# Patient Record
Sex: MALE | Race: ASIAN | NOT HISPANIC OR LATINO | Employment: UNEMPLOYED | URBAN - METROPOLITAN AREA
[De-identification: names, ages, dates, MRNs, and addresses within clinical notes are randomized per-mention and may not be internally consistent; named-entity substitution may affect disease eponyms.]

---

## 2020-06-10 ENCOUNTER — TELEPHONE (OUTPATIENT)
Dept: INTERNAL MEDICINE | Facility: CLINIC | Age: 25
End: 2020-06-10

## 2020-06-10 NOTE — TELEPHONE ENCOUNTER
LVM that when he sees Dr. Ferguson they can discuss this and she would be the one to order this. But it would totally up to Dr. Ferguson to have him do this. Nay more questions he can call office and number was provided

## 2020-06-10 NOTE — TELEPHONE ENCOUNTER
Patient requested a call back. Patient has a new patient appointment with Dr. Ferguson on 7/8/20 and would like to know if he could get a hepatitis B vaccine and a tuberculosis blood test while in the office.    Please call and advise. Patient call back [phone]

## 2020-07-03 NOTE — PROGRESS NOTES
"Subjective   John Avelar is a 24 y.o. male.     History of Present Illness     New pt here to establish. H/o:    DMt2- diagnosed in '17. Felt tired and a1c was in the 6 range, and he was started on metformin 500 bid. He has tolerated this, and has also worked on exercise and healthier diet and a1c has improved. He had it rechecked last month and a1c was down to 5.8  exercisies 5d/week    With most recent labs, Alt a little high at 57; no ETOH, no OTC tylenol or nsaids    Had some rectal bleeding in '16 and had colonoscopy that showed  2-3 polyps; Dr Paulson at [facility]. He was supposed to have a f/u colon but was delayed because of covid-19. No recurrences of the bleeding or other GI symptoms. 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       No current facility-administered medications on file prior to visit.        The following portions of the patient's history were reviewed and updated as appropriate: allergies, current medications, past family history, past medical history, past social history, past surgical history and problem list.    Review of Systems   Constitutional: Negative for activity change, appetite change, fever, unexpected weight gain and unexpected weight loss.   Respiratory: Negative for shortness of breath.    Cardiovascular: Negative for chest pain.   Gastrointestinal: Negative for anal bleeding, blood in stool, constipation and diarrhea.   Skin: Negative.    Allergic/Immunologic: Negative for immunocompromised state.   Neurological: Negative for seizures, memory problem and confusion.   Psychiatric/Behavioral: Negative for agitation.       Objective   /74 (BP Location: Right arm, Patient Position: Sitting)   Pulse 68   Temp 97.3 °F (36.3 °C) (Infrared)   Ht 184.4 cm (72.6\")   Wt 83.5 " kg (184 lb)   SpO2 99%   BMI 24.54 kg/m²   Physical Exam   Constitutional: He is oriented to person, place, and time. He appears well-developed and well-nourished. No distress.   HENT:   Head: Normocephalic and atraumatic.   Eyes: Pupils are equal, round, and reactive to light. Conjunctivae and EOM are normal. Right eye exhibits no discharge. Left eye exhibits no discharge.   Neck: Normal range of motion. Neck supple.   Cardiovascular: Normal rate and regular rhythm.   Pulmonary/Chest: Effort normal and breath sounds normal.   Musculoskeletal: He exhibits no edema.   Neurological: He is alert and oriented to person, place, and time. No cranial nerve deficit.   Skin: Skin is warm and dry. No rash noted. He is not diaphoretic.   Psychiatric: He has a normal mood and affect. His behavior is normal. Judgment and thought content normal.   Nursing note and vitals reviewed.    Labs from 6/19/20 reviewed      Assessment/Plan   John was seen today for establish care and diabetes.    Diagnoses and all orders for this visit:    Controlled type 2 diabetes mellitus without complication, without long-term current use of insulin (CMS/[facility])- good control, recheck in 5m. He sees optometrist yearly and this is UTD. Foot exams regularly     Elevated liver enzymes- recheck end of this month  -     Hepatic Function Panel; Future  -     Hepatitis C Antibody; Future    Hx of colonic polyps  -     Ambulatory Referral For Screening Colonoscopy

## 2020-07-08 ENCOUNTER — OFFICE VISIT (OUTPATIENT)
Dept: INTERNAL MEDICINE | Facility: CLINIC | Age: 25
End: 2020-07-08

## 2020-07-08 VITALS
HEIGHT: 73 IN | WEIGHT: 184 LBS | SYSTOLIC BLOOD PRESSURE: 122 MMHG | TEMPERATURE: 97.3 F | OXYGEN SATURATION: 99 % | BODY MASS INDEX: 24.39 KG/M2 | HEART RATE: 68 BPM | DIASTOLIC BLOOD PRESSURE: 74 MMHG

## 2020-07-08 DIAGNOSIS — E11.9 CONTROLLED TYPE 2 DIABETES MELLITUS WITHOUT COMPLICATION, WITHOUT LONG-TERM CURRENT USE OF INSULIN (HCC): Primary | ICD-10-CM

## 2020-07-08 DIAGNOSIS — R74.8 ELEVATED LIVER ENZYMES: ICD-10-CM

## 2020-07-08 DIAGNOSIS — Z86.010 HX OF COLONIC POLYPS: ICD-10-CM

## 2020-07-08 PROCEDURE — 99203 OFFICE O/P NEW LOW 30 MIN: CPT | Performed by: INTERNAL MEDICINE

## 2020-07-24 ENCOUNTER — LAB (OUTPATIENT)
Dept: LAB | Facility: HOSPITAL | Age: 25
End: 2020-07-24

## 2020-07-24 DIAGNOSIS — R74.8 ELEVATED LIVER ENZYMES: ICD-10-CM

## 2020-07-24 PROCEDURE — 86803 HEPATITIS C AB TEST: CPT

## 2020-07-24 PROCEDURE — 80076 HEPATIC FUNCTION PANEL: CPT

## 2020-07-25 LAB
ALBUMIN SERPL-MCNC: 4.6 G/DL (ref 3.5–5.2)
ALP SERPL-CCNC: 57 U/L (ref 39–117)
ALT SERPL W P-5'-P-CCNC: 28 U/L (ref 1–41)
AST SERPL-CCNC: 18 U/L (ref 1–40)
BILIRUB CONJ SERPL-MCNC: <0.2 MG/DL (ref 0–0.3)
BILIRUB INDIRECT SERPL-MCNC: NORMAL MG/DL
BILIRUB SERPL-MCNC: <0.2 MG/DL (ref 0–1.2)
HCV AB SER DONR QL: NORMAL
PROT SERPL-MCNC: 7.1 G/DL (ref 6–8.5)

## 2020-11-20 NOTE — TELEPHONE ENCOUNTER
.Caller: John Avelar    Relationship: Self    Best call back number: [phone]    Medication needed:   Requested Prescriptions     Pending Prescriptions Disp Refills   • metFORMIN (GLUCOPHAGE) 500 MG tablet        Si tablet 2 (Two) Times a Day With Meals.       When do you need the refill by:2020    What details did the patient provide when requesting the medication: PATIENT HAS LESS THAN A 3 DAY SUPPLY    Does the patient have less than a 3 day supply:  [x] Yes  [] No    What is the patient's preferred pharmacy: [address] [phone] [facility] [phone]

## 2020-11-23 NOTE — TELEPHONE ENCOUNTER
Caller: John Avelar    Relationship: Self    Best call back number:   [phone]    Medication needed:   Requested Prescriptions     Pending Prescriptions Disp Refills   • metFORMIN (GLUCOPHAGE) 500 MG tablet       Si tablet 2 (Two) Times a Day With Meals.       When do you need the refill by:   AS SOON AS POSSIBLE    What details did the patient provide when requesting the medication:   PATIENT HAS ONE PILL LEFT FOR TODAY    Does the patient have less than a 3 day supply:  [x] Yes  [] No    What is the patient's preferred pharmacy: [address] [phone] [facility] [phone]

## 2020-12-15 NOTE — PROGRESS NOTES
Subjective   John Avelar is a 24 y.o. male.     History of Present Illness     Here for f/u on:    DM type II-last A1c was 5.8 done 6 months ago.  He is on Metformin 500 twice a day and he tolerates this.  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    Depression/anxiety-he has struggled with both for some time and felt like he managed okay with lifestyle changes but with med school as well as stresses with Covid, symptoms have worsened in the last few months.  He would like to start an SSRI.  He has never been on any medication.  He feels both stress and anxiety as well as feeling down.  Sometimes has trouble sleeping.  He has had suicidal thoughts but does not feel suicidal currently or have a plan currently.  He is on winter break and is staying with his parents.  He states he has not discussed with his parents but does plan to over the break.  He does feel like he has some close friends at school as well    Current Outpatient Medications on File Prior to Visit   Medication Sig Dispense Refill   • metFORMIN (GLUCOPHAGE) 500 MG tablet Take 1 tablet by mouth 2 (Two) Times a Day With Meals. 60 tablet 0     No current facility-administered medications on file prior to visit.        The following portions of the patient's history were reviewed and updated as appropriate: allergies, current medications, past family history, past medical history, past social history, past surgical history and problem list.    Review of Systems   Constitutional: Positive for activity change and unexpected weight gain. Negative for appetite change, fever and unexpected weight loss.   Psychiatric/Behavioral: Positive for dysphoric mood, sleep disturbance, suicidal ideas, depressed mood and stress. Negative for behavioral problems. The patient is nervous/anxious.        Objective   /78 (BP Location: Right arm, Patient Position: Sitting)   Pulse 71    "Temp 97.8 °F (36.6 °C) (Infrared)   Ht 184.4 cm (72.6\")   Wt 88.9 kg (196 lb)   SpO2 100%   BMI 26.14 kg/m²   Physical Exam  Constitutional:       General: He is not in acute distress.     Appearance: Normal appearance. He is well-developed and normal weight. He is not diaphoretic.   HENT:      Head: Normocephalic and atraumatic.   Eyes:      Conjunctiva/sclera: Conjunctivae normal.   Cardiovascular:      Rate and Rhythm: Normal rate and regular rhythm.      Heart sounds: Normal heart sounds. No murmur. No friction rub. No gallop.    Pulmonary:      Effort: Pulmonary effort is normal. No respiratory distress.      Breath sounds: Normal breath sounds. No wheezing.   Skin:     General: Skin is warm and dry.   Neurological:      Mental Status: He is alert and oriented to person, place, and time.   Psychiatric:         Mood and Affect: Mood normal.         Behavior: Behavior normal.         Thought Content: Thought content normal.         Judgment: Judgment normal.         a1c 6.3      Assessment/Plan   Diagnoses and all orders for this visit:    1. Controlled type 2 diabetes mellitus without complication, without long-term current use of insulin (CMS/[facility]) (Primary)-A1c looks good.  Continue Metformin.  Continue working on healthy diet and exercise  -     CBC (No Diff); Future  -     Comprehensive Metabolic Panel; Future  -     TSH; Future  -     Microalbumin / Creatinine Urine Ratio - Urine, Clean Catch; Future  -     POC Glycosylated Hemoglobin (Hb A1C)  -     metFORMIN (GLUCOPHAGE) 500 MG tablet; Take 1 tablet by mouth 2 (Two) Times a Day With Meals.  Dispense: 180 tablet; Refill: 1  -     Lipid Panel; Future    2. Hx of colonic polyps-patient had colonoscopy last month and we will request report    3. Current moderate episode of major depressive disorder without prior episode (CMS/HCC)-we will start Lexapro.  Side effects reviewed.  He denies suicidal thoughts currently but is aware of the suicide hotline and " also will discuss his symptoms with his parents.  We discussed that the Lexapro can take 4 weeks to kick in and he will call or return if any problems.  We discussed we can do a video or phone visit in a month and see how he is doing.  He also is agreeable to reaching out to [facility] [facility] health  -     escitalopram (Lexapro) 10 MG tablet; 1/2 q d x 1 week, then 1 qd  Dispense: 30 tablet; Refill: 11          Preventative:  Flu shot-had   colonoscopy (personal history of polyps)-did have in 11/20  Sees eye doctor yearly

## 2020-12-17 ENCOUNTER — LAB (OUTPATIENT)
Dept: LAB | Facility: HOSPITAL | Age: 25
End: 2020-12-17

## 2020-12-17 ENCOUNTER — OFFICE VISIT (OUTPATIENT)
Dept: INTERNAL MEDICINE | Facility: CLINIC | Age: 25
End: 2020-12-17

## 2020-12-17 VITALS
WEIGHT: 196 LBS | TEMPERATURE: 97.8 F | HEART RATE: 71 BPM | SYSTOLIC BLOOD PRESSURE: 128 MMHG | HEIGHT: 73 IN | BODY MASS INDEX: 25.98 KG/M2 | OXYGEN SATURATION: 100 % | DIASTOLIC BLOOD PRESSURE: 78 MMHG

## 2020-12-17 DIAGNOSIS — E11.9 CONTROLLED TYPE 2 DIABETES MELLITUS WITHOUT COMPLICATION, WITHOUT LONG-TERM CURRENT USE OF INSULIN (HCC): Primary | ICD-10-CM

## 2020-12-17 DIAGNOSIS — F32.1 CURRENT MODERATE EPISODE OF MAJOR DEPRESSIVE DISORDER WITHOUT PRIOR EPISODE (HCC): ICD-10-CM

## 2020-12-17 DIAGNOSIS — Z86.010 HX OF COLONIC POLYPS: ICD-10-CM

## 2020-12-17 DIAGNOSIS — E11.9 CONTROLLED TYPE 2 DIABETES MELLITUS WITHOUT COMPLICATION, WITHOUT LONG-TERM CURRENT USE OF INSULIN (HCC): ICD-10-CM

## 2020-12-17 LAB — HBA1C MFR BLD: 6.3 %

## 2020-12-17 PROCEDURE — 85027 COMPLETE CBC AUTOMATED: CPT | Performed by: INTERNAL MEDICINE

## 2020-12-17 PROCEDURE — 80061 LIPID PANEL: CPT | Performed by: INTERNAL MEDICINE

## 2020-12-17 PROCEDURE — 84439 ASSAY OF FREE THYROXINE: CPT | Performed by: INTERNAL MEDICINE

## 2020-12-17 PROCEDURE — 84466 ASSAY OF TRANSFERRIN: CPT | Performed by: INTERNAL MEDICINE

## 2020-12-17 PROCEDURE — 84443 ASSAY THYROID STIM HORMONE: CPT | Performed by: INTERNAL MEDICINE

## 2020-12-17 PROCEDURE — 99214 OFFICE O/P EST MOD 30 MIN: CPT | Performed by: INTERNAL MEDICINE

## 2020-12-17 PROCEDURE — 83036 HEMOGLOBIN GLYCOSYLATED A1C: CPT | Performed by: INTERNAL MEDICINE

## 2020-12-17 PROCEDURE — 80053 COMPREHEN METABOLIC PANEL: CPT | Performed by: INTERNAL MEDICINE

## 2020-12-17 PROCEDURE — 83540 ASSAY OF IRON: CPT | Performed by: INTERNAL MEDICINE

## 2020-12-17 RX ORDER — ESCITALOPRAM OXALATE 10 MG/1
TABLET ORAL
Qty: 30 TABLET | Refills: 11 | Status: SHIPPED | OUTPATIENT
Start: 2020-12-17 | End: 2021-01-12 | Stop reason: SDUPTHER

## 2020-12-18 ENCOUNTER — TELEPHONE (OUTPATIENT)
Dept: INTERNAL MEDICINE | Facility: CLINIC | Age: 25
End: 2020-12-18

## 2020-12-18 LAB
ALBUMIN SERPL-MCNC: 4.8 G/DL (ref 3.5–5.2)
ALBUMIN/GLOB SERPL: 1.8 G/DL
ALP SERPL-CCNC: 62 U/L (ref 39–117)
ALT SERPL W P-5'-P-CCNC: 37 U/L (ref 1–41)
ANION GAP SERPL CALCULATED.3IONS-SCNC: 12 MMOL/L (ref 5–15)
AST SERPL-CCNC: 22 U/L (ref 1–40)
BILIRUB SERPL-MCNC: 0.3 MG/DL (ref 0–1.2)
BUN SERPL-MCNC: 13 MG/DL (ref 6–20)
BUN/CREAT SERPL: 13.7 (ref 7–25)
CALCIUM SPEC-SCNC: 9.8 MG/DL (ref 8.6–10.5)
CHLORIDE SERPL-SCNC: 107 MMOL/L (ref 98–107)
CHOLEST SERPL-MCNC: 209 MG/DL (ref 0–200)
CO2 SERPL-SCNC: 22 MMOL/L (ref 22–29)
CREAT SERPL-MCNC: 0.95 MG/DL (ref 0.76–1.27)
DEPRECATED RDW RBC AUTO: 41.1 FL (ref 37–54)
ERYTHROCYTE [DISTWIDTH] IN BLOOD BY AUTOMATED COUNT: 15.1 % (ref 12.3–15.4)
GFR SERPL CREATININE-BSD FRML MDRD: 118 ML/MIN/1.73
GFR SERPL CREATININE-BSD FRML MDRD: 97 ML/MIN/1.73
GLOBULIN UR ELPH-MCNC: 2.6 GM/DL
GLUCOSE SERPL-MCNC: 99 MG/DL (ref 65–99)
HCT VFR BLD AUTO: 41.9 % (ref 37.5–51)
HDLC SERPL-MCNC: 42 MG/DL (ref 40–60)
HGB BLD-MCNC: 13.6 G/DL (ref 13–17.7)
LDLC SERPL CALC-MCNC: 117 MG/DL (ref 0–100)
LDLC/HDLC SERPL: 2.6 {RATIO}
MCH RBC QN AUTO: 25.1 PG (ref 26.6–33)
MCHC RBC AUTO-ENTMCNC: 32.5 G/DL (ref 31.5–35.7)
MCV RBC AUTO: 77.3 FL (ref 79–97)
PLATELET # BLD AUTO: 253 10*3/MM3 (ref 140–450)
PMV BLD AUTO: 10.3 FL (ref 6–12)
POTASSIUM SERPL-SCNC: 4.5 MMOL/L (ref 3.5–5.2)
PROT SERPL-MCNC: 7.4 G/DL (ref 6–8.5)
RBC # BLD AUTO: 5.42 10*6/MM3 (ref 4.14–5.8)
SODIUM SERPL-SCNC: 141 MMOL/L (ref 136–145)
TRIGL SERPL-MCNC: 289 MG/DL (ref 0–150)
TSH SERPL DL<=0.05 MIU/L-ACNC: 4.77 UIU/ML (ref 0.27–4.2)
VLDLC SERPL-MCNC: 50 MG/DL (ref 5–40)
WBC # BLD AUTO: 4.95 10*3/MM3 (ref 3.4–10.8)

## 2020-12-18 NOTE — TELEPHONE ENCOUNTER
----- Message from Judy Ferguson MD sent at 12/17/2020  5:50 PM EST -----  Regarding: Colonoscopy  Can we get his colonoscopy report from [facility] done last month

## 2020-12-19 DIAGNOSIS — E03.9 HYPOTHYROIDISM (ACQUIRED): Primary | ICD-10-CM

## 2020-12-19 DIAGNOSIS — R71.8 MICROCYTOSIS: Primary | ICD-10-CM

## 2020-12-19 LAB
IRON 24H UR-MRATE: 55 MCG/DL (ref 59–158)
IRON SATN MFR SERPL: 10 % (ref 20–50)
T4 FREE SERPL-MCNC: 1.26 NG/DL (ref 0.93–1.7)
TIBC SERPL-MCNC: 551 MCG/DL (ref 298–536)
TRANSFERRIN SERPL-MCNC: 370 MG/DL (ref 200–360)

## 2021-01-12 DIAGNOSIS — F32.1 CURRENT MODERATE EPISODE OF MAJOR DEPRESSIVE DISORDER WITHOUT PRIOR EPISODE (HCC): ICD-10-CM

## 2021-01-12 RX ORDER — ESCITALOPRAM OXALATE 10 MG/1
10 TABLET ORAL DAILY
Qty: 90 TABLET | Refills: 3 | Status: SHIPPED | OUTPATIENT
Start: 2021-01-12 | End: 2021-05-17 | Stop reason: ALTCHOICE

## 2021-01-12 NOTE — TELEPHONE ENCOUNTER
Can you send his escitalopram 10 mg to [facility] 90/3. It was originally sent to [facility] for 30/11 on 12/17/2020.  Patient states he is now on the one pill a day.

## 2021-03-17 ENCOUNTER — OFFICE VISIT (OUTPATIENT)
Dept: INTERNAL MEDICINE | Facility: CLINIC | Age: 26
End: 2021-03-17

## 2021-03-17 DIAGNOSIS — F32.1 CURRENT MODERATE EPISODE OF MAJOR DEPRESSIVE DISORDER WITHOUT PRIOR EPISODE (HCC): Primary | ICD-10-CM

## 2021-03-17 PROCEDURE — 99441 PR PHYS/QHP TELEPHONE EVALUATION 5-10 MIN: CPT | Performed by: INTERNAL MEDICINE

## 2021-03-17 RX ORDER — BUPROPION HYDROCHLORIDE 150 MG/1
150 TABLET ORAL EVERY MORNING
Qty: 7 TABLET | Refills: 0 | Status: SHIPPED | OUTPATIENT
Start: 2021-03-17 | End: 2021-05-17 | Stop reason: ALTCHOICE

## 2021-03-17 RX ORDER — BUPROPION HYDROCHLORIDE 300 MG/1
300 TABLET ORAL DAILY
Qty: 90 TABLET | Refills: 3 | Status: SHIPPED | OUTPATIENT
Start: 2021-03-17 | End: 2022-03-10 | Stop reason: SDUPTHER

## 2021-03-17 NOTE — PROGRESS NOTES
Chief Complaint  Medication Problem (Lexapro, doesn't work very well, no change )    Subjective          John Avelar presents to [facility]  History of Present Illness    This visit was a telephone visit because of the COVID-19 pandemic.  Consent was obtained for phone visit.  Total visit time was 8 minutes    When he was here 3 months ago, he was having some depression and anxiety symptoms and we started Lexapro 10 mg.  He has been taking this regularly since then but has not felt much improvement in the depression and anxiety.  He finds it hard to concentrate and study.  Also lack of motivation and fatigue.  More depression than anxiety but when he does not get work done he feels anxious about it;. some trouble sleeping at times and will use melatonin which helps.  Suicidal thoughts occasionally occur but he denies any suicidal plans and does not feel like he would act on it.  He has discussed his symptoms with his family and friends and feels like he has a lot of support.  He has not seen a counselor yet  He denies any alcohol or illicit drug use        Current Outpatient Medications:   •  escitalopram (Lexapro) 10 MG tablet, Take 1 tablet by mouth Daily., Disp: 90 tablet, Rfl: 3  •  metFORMIN (GLUCOPHAGE) 500 MG tablet, Take 1 tablet by mouth 2 (Two) Times a Day With Meals., Disp: 180 tablet, Rfl: 1  •  buPROPion XL (Wellbutrin XL) 150 MG 24 hr tablet, Take 1 tablet by mouth Every Morning., Disp: 7 tablet, Rfl: 0  •  buPROPion XL (Wellbutrin XL) 300 MG 24 hr tablet, Take 1 tablet by mouth Daily., Disp: 90 tablet, Rfl: 3      Objective   Vital Signs:   There were no vitals taken for this visit.      Physical exam  Constitutional: oriented to person, place, and time.    Psychiatric:  normal mood and affect. behavior is normal. Judgment and thought content normal.      Result Review :                   Assessment and Plan {CC Problem List  Visit Diagnosis  ROS  Review  (Popup)  Health Maintenance  Quality  BestPractice  Medications  SmartSets  SnapShot Encounters  Media :23}   Diagnoses and all orders for this visit:    1. Current moderate episode of major depressive disorder without prior episode (CMS/[facility]) (Primary)  Comments:  We will start Wellbutrin  mg daily for 1 week then increase to 300 mg.  At same time, we will wean Lexapro-half tab daily for 1 week, then half tab every other day for 1 week then DC.  Discussed withdrawal symptoms of Lexapro.  Side effects of Wellbutrin reviewed with patient.  Call if any problems or difficulties.  Encouraged him also to get set up with a counselor as I think this would help along with the medication  Orders:  -     buPROPion XL (Wellbutrin XL) 150 MG 24 hr tablet; Take 1 tablet by mouth Every Morning.  Dispense: 7 tablet; Refill: 0  -     buPROPion XL (Wellbutrin XL) 300 MG 24 hr tablet; Take 1 tablet by mouth Daily.  Dispense: 90 tablet; Refill: 3        Follow Up   No follow-ups on file.  Patient was given instructions and counseling regarding his condition or for health maintenance advice. Please see specific information pulled into the AVS if appropriate.

## 2021-05-12 DIAGNOSIS — E11.9 CONTROLLED TYPE 2 DIABETES MELLITUS WITHOUT COMPLICATION, WITHOUT LONG-TERM CURRENT USE OF INSULIN (HCC): ICD-10-CM

## 2021-05-17 ENCOUNTER — OFFICE VISIT (OUTPATIENT)
Dept: INTERNAL MEDICINE | Facility: CLINIC | Age: 26
End: 2021-05-17

## 2021-05-17 ENCOUNTER — LAB (OUTPATIENT)
Dept: LAB | Facility: HOSPITAL | Age: 26
End: 2021-05-17

## 2021-05-17 VITALS
BODY MASS INDEX: 24.76 KG/M2 | HEIGHT: 73 IN | HEART RATE: 75 BPM | TEMPERATURE: 97.8 F | OXYGEN SATURATION: 98 % | WEIGHT: 186.8 LBS | DIASTOLIC BLOOD PRESSURE: 76 MMHG | SYSTOLIC BLOOD PRESSURE: 122 MMHG

## 2021-05-17 DIAGNOSIS — R59.0 CERVICAL LYMPHADENOPATHY: ICD-10-CM

## 2021-05-17 DIAGNOSIS — F32.1 CURRENT MODERATE EPISODE OF MAJOR DEPRESSIVE DISORDER WITHOUT PRIOR EPISODE (HCC): ICD-10-CM

## 2021-05-17 DIAGNOSIS — E03.9 HYPOTHYROIDISM (ACQUIRED): ICD-10-CM

## 2021-05-17 DIAGNOSIS — E11.9 CONTROLLED TYPE 2 DIABETES MELLITUS WITHOUT COMPLICATION, WITHOUT LONG-TERM CURRENT USE OF INSULIN (HCC): Primary | Chronic | ICD-10-CM

## 2021-05-17 DIAGNOSIS — R71.8 MICROCYTOSIS: ICD-10-CM

## 2021-05-17 LAB
ALBUMIN SERPL-MCNC: 4.8 G/DL (ref 3.5–5.2)
ALBUMIN/GLOB SERPL: 2.4 G/DL
ALP SERPL-CCNC: 83 U/L (ref 39–117)
ALT SERPL W P-5'-P-CCNC: 35 U/L (ref 1–41)
ANION GAP SERPL CALCULATED.3IONS-SCNC: 12.6 MMOL/L (ref 5–15)
AST SERPL-CCNC: 21 U/L (ref 1–40)
BASOPHILS # BLD AUTO: 0.05 10*3/MM3 (ref 0–0.2)
BASOPHILS NFR BLD AUTO: 0.9 % (ref 0–1.5)
BILIRUB SERPL-MCNC: 0.3 MG/DL (ref 0–1.2)
BUN SERPL-MCNC: 11 MG/DL (ref 6–20)
BUN/CREAT SERPL: 10.7 (ref 7–25)
CALCIUM SPEC-SCNC: 9.6 MG/DL (ref 8.6–10.5)
CHLORIDE SERPL-SCNC: 104 MMOL/L (ref 98–107)
CO2 SERPL-SCNC: 21.4 MMOL/L (ref 22–29)
CREAT SERPL-MCNC: 1.03 MG/DL (ref 0.76–1.27)
DEPRECATED RDW RBC AUTO: 38.7 FL (ref 37–54)
EOSINOPHIL # BLD AUTO: 0.24 10*3/MM3 (ref 0–0.4)
EOSINOPHIL NFR BLD AUTO: 4.3 % (ref 0.3–6.2)
ERYTHROCYTE [DISTWIDTH] IN BLOOD BY AUTOMATED COUNT: 13.5 % (ref 12.3–15.4)
FERRITIN SERPL-MCNC: 53 NG/ML (ref 30–400)
GFR SERPL CREATININE-BSD FRML MDRD: 107 ML/MIN/1.73
GFR SERPL CREATININE-BSD FRML MDRD: 88 ML/MIN/1.73
GLOBULIN UR ELPH-MCNC: 2 GM/DL
GLUCOSE SERPL-MCNC: 101 MG/DL (ref 65–99)
HBA1C MFR BLD: 6.4 %
HCT VFR BLD AUTO: 46.2 % (ref 37.5–51)
HGB BLD-MCNC: 15.3 G/DL (ref 13–17.7)
IMM GRANULOCYTES # BLD AUTO: 0.03 10*3/MM3 (ref 0–0.05)
IMM GRANULOCYTES NFR BLD AUTO: 0.5 % (ref 0–0.5)
IRON 24H UR-MRATE: 60 MCG/DL (ref 59–158)
IRON SATN MFR SERPL: 11 % (ref 20–50)
LYMPHOCYTES # BLD AUTO: 1.58 10*3/MM3 (ref 0.7–3.1)
LYMPHOCYTES NFR BLD AUTO: 28.4 % (ref 19.6–45.3)
MCH RBC QN AUTO: 27 PG (ref 26.6–33)
MCHC RBC AUTO-ENTMCNC: 33.1 G/DL (ref 31.5–35.7)
MCV RBC AUTO: 81.5 FL (ref 79–97)
MONOCYTES # BLD AUTO: 0.4 10*3/MM3 (ref 0.1–0.9)
MONOCYTES NFR BLD AUTO: 7.2 % (ref 5–12)
NEUTROPHILS NFR BLD AUTO: 3.26 10*3/MM3 (ref 1.7–7)
NEUTROPHILS NFR BLD AUTO: 58.7 % (ref 42.7–76)
NRBC BLD AUTO-RTO: 0 /100 WBC (ref 0–0.2)
PLATELET # BLD AUTO: 253 10*3/MM3 (ref 140–450)
PMV BLD AUTO: 9.8 FL (ref 6–12)
POTASSIUM SERPL-SCNC: 4.5 MMOL/L (ref 3.5–5.2)
PROT SERPL-MCNC: 6.8 G/DL (ref 6–8.5)
RBC # BLD AUTO: 5.67 10*6/MM3 (ref 4.14–5.8)
SODIUM SERPL-SCNC: 138 MMOL/L (ref 136–145)
T4 FREE SERPL-MCNC: 1.37 NG/DL (ref 0.93–1.7)
TIBC SERPL-MCNC: 536 MCG/DL (ref 298–536)
TRANSFERRIN SERPL-MCNC: 360 MG/DL (ref 200–360)
TSH SERPL DL<=0.05 MIU/L-ACNC: 3.86 UIU/ML (ref 0.27–4.2)
WBC # BLD AUTO: 5.56 10*3/MM3 (ref 3.4–10.8)

## 2021-05-17 PROCEDURE — 80053 COMPREHEN METABOLIC PANEL: CPT | Performed by: INTERNAL MEDICINE

## 2021-05-17 PROCEDURE — 83036 HEMOGLOBIN GLYCOSYLATED A1C: CPT | Performed by: INTERNAL MEDICINE

## 2021-05-17 PROCEDURE — 83540 ASSAY OF IRON: CPT | Performed by: INTERNAL MEDICINE

## 2021-05-17 PROCEDURE — 84466 ASSAY OF TRANSFERRIN: CPT | Performed by: INTERNAL MEDICINE

## 2021-05-17 PROCEDURE — 84443 ASSAY THYROID STIM HORMONE: CPT | Performed by: INTERNAL MEDICINE

## 2021-05-17 PROCEDURE — 82728 ASSAY OF FERRITIN: CPT | Performed by: INTERNAL MEDICINE

## 2021-05-17 PROCEDURE — 84439 ASSAY OF FREE THYROXINE: CPT | Performed by: INTERNAL MEDICINE

## 2021-05-17 PROCEDURE — 85025 COMPLETE CBC W/AUTO DIFF WBC: CPT | Performed by: INTERNAL MEDICINE

## 2021-05-17 PROCEDURE — 99214 OFFICE O/P EST MOD 30 MIN: CPT | Performed by: INTERNAL MEDICINE

## 2021-05-17 NOTE — PROGRESS NOTES
"Answers for HPI/ROS submitted by the patient on 5/15/2021  What is the primary reason for your visit?: Other  Please describe your symptoms.: Swollen lymph node’s for almost two months without any obvious infection. 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 Not tender. No abnormal wt loss, fevers, or decreased appetite. No recent URI or mon symptoms. Did get the COVID vaccines several months ago    DMT2 - on metformin 500 bid and taking this regularly. Diet is healthy and he has started doing some weights. Not doing cardio right now but hopes to. He will do some research over the summer.  Last a1c was 6.3 done 5 months ago    Microcytosis and fe deficiecny - he had been donating blood but has not in just under a year (summer '20). He did take the iron regualrly for several months but stopped about 2 months aog. Has felt better - not lightheaded anymore    Depression - wellbutrin working well, energy better than w/ lexapro    TSH was slightly high in 12/20      Current Outpatient Medications:   •  buPROPion XL (Wellbutrin XL) 300 MG 24 hr tablet, Take 1 tablet by mouth Daily., Disp: 90 tablet, Rfl: 3  •  metFORMIN (GLUCOPHAGE) 500 MG tablet, TAKE 1 TABLET TWICE DAILY  WITH MEALS, Disp: 180 tablet, Rfl: 0      Objective   Vital Signs:   /76 (BP Location: Right arm, Patient Position: Sitting)   Pulse 75   Temp 97.8 °F (36.6 °C) (Infrared)   Ht 184.4 cm (72.6\")   Wt 84.7 kg (186 lb 12.8 oz)   SpO2 98%   BMI 24.92 kg/m²       Physical exam  Constitutional: " oriented to person, place, and time.  well-developed and well-nourished. No distress.   HENT: R occipital and R post cervical - 2 small LNs ~ 8mm. L post cervical slightly larger ~ 1cm. No axillary nodes appreciated today  Head: Normocephalic and atraumatic.   Eyes: Conjunctivae and EOM are normal.   Cardiovascular: Normal rate, regular rhythm and normal heart sounds.  Exam reveals no gallop and no friction rub.    No murmur heard.  Pulmonary/Chest: Effort normal and breath sounds normal. No respiratory distress.   no wheezes.   Neurological:  alert and oriented to person, place, and time.   Skin: Skin is warm and dry. not diaphoretic.   Psychiatric:  normal mood and affect. behavior is normal. Judgment and thought content normal.      Result Review :   The following data was reviewed by: Judy Ferguson MD on 05/17/2021:  CBC w/diff    CBC w/Diff 12/17/20   WBC 4.95   RBC 5.42   Hemoglobin 13.6   Hematocrit 41.9   MCV 77.3 (A)   MCH 25.1 (A)   MCHC 32.5   RDW 15.1   Platelets 253   (A) Abnormal value            Lipid Panel    Lipid Panel 12/17/20   Total Cholesterol 209 (A)   Triglycerides 289 (A)   HDL Cholesterol 42   VLDL Cholesterol 50 (A)   LDL Cholesterol  117 (A)   LDL/HDL Ratio 2.60   (A) Abnormal value            A1C Last 3 Results    HGBA1C Last 3 Results 12/17/20 5/17/21   Hemoglobin A1C 6.3 6.4                       Assessment and Plan    Diagnoses and all orders for this visit:    1. Controlled type 2 diabetes mellitus without complication, without long-term current use of insulin (CMS/[facility]) (Primary)  Comments:  a1c well controlled, will continue metformin. recheck a1c in 6m  Orders:  -     POC Glycosylated Hemoglobin (Hb A1C)  -     Comprehensive Metabolic Panel; Future    2. Hypothyroidism (acquired)  Comments:  recheck today  Orders:  -     TSH; Future  -     T4, Free; Future    3. Microcytosis  Comments:  recheck cbc and fe studies  Orders:  -     CBC & Differential; Future  -     Iron Profile;  Future  -     Ferritin; Future    4. Current moderate episode of major depressive disorder without prior episode (CMS/HCC)  Comments:  doing well w/ wellbutrin, we will continue    5. Cervical lymphadenopathy  Comments:  small LNs, likely reactive. will check CBC today. monitor for any enlargement        Follow Up   Return in about 6 months (around 11/17/2021) for Next scheduled follow up.  Patient was given instructions and counseling regarding his condition or for health maintenance advice. Please see specific information pulled into the AVS if appropriate.

## 2021-05-28 DIAGNOSIS — E11.9 CONTROLLED TYPE 2 DIABETES MELLITUS WITHOUT COMPLICATION, WITHOUT LONG-TERM CURRENT USE OF INSULIN (HCC): ICD-10-CM

## 2022-01-01 DIAGNOSIS — E11.9 CONTROLLED TYPE 2 DIABETES MELLITUS WITHOUT COMPLICATION, WITHOUT LONG-TERM CURRENT USE OF INSULIN: ICD-10-CM

## 2022-01-03 NOTE — TELEPHONE ENCOUNTER
He needs an appointment for a follow up on his diabetes.  He was last seen in May.    LMOVM asking patient to return call to see if he has enough of the metformin until he can come in for an appointment.    HUB:  If patient calls back schedule for a follow up and see if he has enough of his medication until he can come in for an appointment.

## 2022-01-04 DIAGNOSIS — F32.1 CURRENT MODERATE EPISODE OF MAJOR DEPRESSIVE DISORDER WITHOUT PRIOR EPISODE: ICD-10-CM

## 2022-01-04 DIAGNOSIS — E11.9 CONTROLLED TYPE 2 DIABETES MELLITUS WITHOUT COMPLICATION, WITHOUT LONG-TERM CURRENT USE OF INSULIN: ICD-10-CM

## 2022-01-04 NOTE — TELEPHONE ENCOUNTER
LMOVM letting patient know 30 days of medication has been sent to [facility] in [place].  Please call the office and schedule an appointment.

## 2022-01-04 NOTE — TELEPHONE ENCOUNTER
Do you want to fill for 30 days to local until he makes an appointment?  BARBARAM again asking him to call the office.

## 2022-01-06 ENCOUNTER — TELEMEDICINE (OUTPATIENT)
Dept: INTERNAL MEDICINE | Facility: CLINIC | Age: 27
End: 2022-01-06

## 2022-01-06 DIAGNOSIS — F33.1 MODERATE EPISODE OF RECURRENT MAJOR DEPRESSIVE DISORDER: Chronic | ICD-10-CM

## 2022-01-06 DIAGNOSIS — E78.1 HYPERTRIGLYCERIDEMIA: ICD-10-CM

## 2022-01-06 DIAGNOSIS — E11.9 CONTROLLED TYPE 2 DIABETES MELLITUS WITHOUT COMPLICATION, WITHOUT LONG-TERM CURRENT USE OF INSULIN: Primary | Chronic | ICD-10-CM

## 2022-01-06 DIAGNOSIS — E03.9 HYPOTHYROIDISM (ACQUIRED): ICD-10-CM

## 2022-01-06 PROBLEM — F33.9 RECURRENT MAJOR DEPRESSIVE DISORDER (HCC): Chronic | Status: ACTIVE | Noted: 2022-01-06

## 2022-01-06 PROBLEM — F33.9 RECURRENT MAJOR DEPRESSIVE DISORDER: Status: ACTIVE | Noted: 2022-01-06

## 2022-01-06 PROCEDURE — 99214 OFFICE O/P EST MOD 30 MIN: CPT | Performed by: INTERNAL MEDICINE

## 2022-01-06 RX ORDER — MINOXIDIL 2.5 MG/1
2.5 TABLET ORAL DAILY
COMMUNITY
End: 2022-05-25

## 2022-01-06 RX ORDER — FINASTERIDE 1 MG/1
1 TABLET, FILM COATED ORAL DAILY
COMMUNITY
End: 2022-05-25

## 2022-01-06 NOTE — PROGRESS NOTES
Chief Complaint  No chief complaint on file.    Subjective          John Avelar presents to [facility]  History of Present Illness    Video Visit was done today because of COVID-19.  patient has consented to receive care via Video Visit    DM type II-last A1c was 6.4 done in 5/21.  He is on metformin 500mg 1 twice a day and tolerating this.  Trying to watch diet but has not been able to exercise.    Depression-he is on Wellbutrin .  He feels this is working well and he likes it better than SSRIs.  He needs letter for his landlord stating his animal helps with his depression.    He has started minoxidil and finasteride for hair loss and is tolerating these    Current Outpatient Medications:   •  buPROPion XL (Wellbutrin XL) 300 MG 24 hr tablet, Take 1 tablet by mouth Daily., Disp: 90 tablet, Rfl: 3  •  metFORMIN (GLUCOPHAGE) 500 MG tablet, Take 1 tablet by mouth 2 (Two) Times a Day With Meals., Disp: 60 tablet, Rfl: 0      Objective   Vital Signs:   There were no vitals taken for this visit.      Physical exam  Constitutional: oriented to person, place, and time.  well-developed and well-nourished. No distress.   HENT:   Head: Normocephalic and atraumatic.   Eyes: Conjunctivae and EOM are normal.   Neurological:  alert and oriented to person, place, and time.   Skin: Skin is warm and dry. not diaphoretic.   Psychiatric:  normal mood and affect. behavior is normal. Judgment and thought content normal.      Result Review :   The following data was reviewed by: Judy Ferguson MD on 01/06/2022:  CMP    CMP 5/17/21   Glucose 101 (A)   BUN 11   Creatinine 1.03   eGFR Non  Am 88   eGFR African Am 107   Sodium 138   Potassium 4.5   Chloride 104   Calcium 9.6   Albumin 4.80   Total Bilirubin 0.3   Alkaline Phosphatase 83   AST (SGOT) 21   ALT (SGPT) 35   (A) Abnormal value            CBC    CBC 5/17/21   WBC 5.56   RBC 5.67   Hemoglobin 15.3   Hematocrit 46.2   MCV 81.5    MCH 27.0   MCHC 33.1   RDW 13.5   Platelets 253               TSH    TSH 5/17/21   TSH 3.860           A1C Last 3 Results    HGBA1C Last 3 Results 5/17/21   Hemoglobin A1C 6.4                           Assessment and Plan    Diagnoses and all orders for this visit:    1. Controlled type 2 diabetes mellitus without complication, without long-term current use of insulin (HCC) (Primary)-we will be in [place], so we will put an order for blood work  -     CBC (No Diff); Future  -     TSH Rfx On Abnormal To Free T4; Future  -     Lipid Panel; Future  -     Comprehensive Metabolic Panel; Future  -     Hemoglobin A1c; Future    2. Moderate episode of recurrent major depressive disorder (HCC)  Doing well-we will continue Wellbutrin    Follow Up   No follow-ups on file.  Patient was given instructions and counseling regarding his condition or for health maintenance advice. Please see specific information pulled into the AVS if appropriate.     He is not using [employer] [facility] anymore but [facility] in [place]  He had flu shot and Covid booster

## 2022-01-12 ENCOUNTER — LAB (OUTPATIENT)
Dept: LAB | Facility: HOSPITAL | Age: 27
End: 2022-01-12

## 2022-01-12 DIAGNOSIS — E11.9 CONTROLLED TYPE 2 DIABETES MELLITUS WITHOUT COMPLICATION, WITHOUT LONG-TERM CURRENT USE OF INSULIN: ICD-10-CM

## 2022-01-12 LAB
ALBUMIN SERPL-MCNC: 5.3 G/DL (ref 3.5–5.2)
ALBUMIN/GLOB SERPL: 3.1 G/DL
ALP SERPL-CCNC: 69 U/L (ref 39–117)
ALT SERPL W P-5'-P-CCNC: 46 U/L (ref 1–41)
ANION GAP SERPL CALCULATED.3IONS-SCNC: 13.8 MMOL/L (ref 5–15)
AST SERPL-CCNC: 27 U/L (ref 1–40)
BILIRUB SERPL-MCNC: 0.2 MG/DL (ref 0–1.2)
BUN SERPL-MCNC: 15 MG/DL (ref 6–20)
BUN/CREAT SERPL: 10.9 (ref 7–25)
CALCIUM SPEC-SCNC: 10.6 MG/DL (ref 8.6–10.5)
CHLORIDE SERPL-SCNC: 102 MMOL/L (ref 98–107)
CHOLEST SERPL-MCNC: 210 MG/DL (ref 0–200)
CO2 SERPL-SCNC: 27.2 MMOL/L (ref 22–29)
CREAT SERPL-MCNC: 1.37 MG/DL (ref 0.76–1.27)
DEPRECATED RDW RBC AUTO: 38 FL (ref 37–54)
ERYTHROCYTE [DISTWIDTH] IN BLOOD BY AUTOMATED COUNT: 13.2 % (ref 12.3–15.4)
GFR SERPL CREATININE-BSD FRML MDRD: 63 ML/MIN/1.73
GFR SERPL CREATININE-BSD FRML MDRD: 76 ML/MIN/1.73
GLOBULIN UR ELPH-MCNC: 1.7 GM/DL
GLUCOSE SERPL-MCNC: 108 MG/DL (ref 65–99)
HBA1C MFR BLD: 7.11 % (ref 4.8–5.6)
HCT VFR BLD AUTO: 44.4 % (ref 37.5–51)
HDLC SERPL-MCNC: 34 MG/DL (ref 40–60)
HGB BLD-MCNC: 15 G/DL (ref 13–17.7)
LDLC SERPL CALC-MCNC: 84 MG/DL (ref 0–100)
LDLC/HDLC SERPL: 1.81 {RATIO}
MCH RBC QN AUTO: 27.3 PG (ref 26.6–33)
MCHC RBC AUTO-ENTMCNC: 33.8 G/DL (ref 31.5–35.7)
MCV RBC AUTO: 80.7 FL (ref 79–97)
PLATELET # BLD AUTO: 247 10*3/MM3 (ref 140–450)
PMV BLD AUTO: 10.3 FL (ref 6–12)
POTASSIUM SERPL-SCNC: 4.4 MMOL/L (ref 3.5–5.2)
PROT SERPL-MCNC: 7 G/DL (ref 6–8.5)
RBC # BLD AUTO: 5.5 10*6/MM3 (ref 4.14–5.8)
SODIUM SERPL-SCNC: 143 MMOL/L (ref 136–145)
TRIGL SERPL-MCNC: 572 MG/DL (ref 0–150)
VLDLC SERPL-MCNC: 92 MG/DL (ref 5–40)
WBC NRBC COR # BLD: 6.77 10*3/MM3 (ref 3.4–10.8)

## 2022-01-12 PROCEDURE — 82043 UR ALBUMIN QUANTITATIVE: CPT | Performed by: INTERNAL MEDICINE

## 2022-01-12 PROCEDURE — 83036 HEMOGLOBIN GLYCOSYLATED A1C: CPT | Performed by: INTERNAL MEDICINE

## 2022-01-12 PROCEDURE — 82570 ASSAY OF URINE CREATININE: CPT | Performed by: INTERNAL MEDICINE

## 2022-01-12 PROCEDURE — 80061 LIPID PANEL: CPT | Performed by: INTERNAL MEDICINE

## 2022-01-12 PROCEDURE — 84439 ASSAY OF FREE THYROXINE: CPT | Performed by: INTERNAL MEDICINE

## 2022-01-12 PROCEDURE — 80050 GENERAL HEALTH PANEL: CPT | Performed by: INTERNAL MEDICINE

## 2022-01-13 LAB
ALBUMIN UR-MCNC: <1.2 MG/DL
CREAT UR-MCNC: 98 MG/DL
MICROALBUMIN/CREAT UR: NORMAL MG/G{CREAT}
T4 FREE SERPL-MCNC: 1.4 NG/DL (ref 0.93–1.7)
TSH SERPL DL<=0.05 MIU/L-ACNC: 6.96 UIU/ML (ref 0.27–4.2)

## 2022-01-16 DIAGNOSIS — E11.9 CONTROLLED TYPE 2 DIABETES MELLITUS WITHOUT COMPLICATION, WITHOUT LONG-TERM CURRENT USE OF INSULIN: ICD-10-CM

## 2022-01-27 ENCOUNTER — TELEPHONE (OUTPATIENT)
Dept: INTERNAL MEDICINE | Facility: CLINIC | Age: 27
End: 2022-01-27

## 2022-01-31 ENCOUNTER — TELEPHONE (OUTPATIENT)
Dept: INTERNAL MEDICINE | Facility: CLINIC | Age: 27
End: 2022-01-31

## 2022-02-01 ENCOUNTER — TELEPHONE (OUTPATIENT)
Dept: INTERNAL MEDICINE | Facility: CLINIC | Age: 27
End: 2022-02-01

## 2022-02-01 NOTE — TELEPHONE ENCOUNTER
----- Message from April FOREST Sharma MA sent at 1/26/2022  2:38 PM EST -----  Patient did not read AOBiome message sent to him.   LVM for the patient to return our call, office number was provided

## 2022-03-08 ENCOUNTER — LAB (OUTPATIENT)
Dept: LAB | Facility: HOSPITAL | Age: 27
End: 2022-03-08

## 2022-03-08 DIAGNOSIS — E03.9 HYPOTHYROIDISM (ACQUIRED): ICD-10-CM

## 2022-03-08 DIAGNOSIS — E78.1 HYPERTRIGLYCERIDEMIA: ICD-10-CM

## 2022-03-08 LAB
ALBUMIN SERPL-MCNC: 4.8 G/DL (ref 3.5–5.2)
ALBUMIN/GLOB SERPL: 2.7 G/DL
ALP SERPL-CCNC: 65 U/L (ref 39–117)
ALT SERPL W P-5'-P-CCNC: 47 U/L (ref 1–41)
ANION GAP SERPL CALCULATED.3IONS-SCNC: 11.9 MMOL/L (ref 5–15)
AST SERPL-CCNC: 19 U/L (ref 1–40)
BILIRUB SERPL-MCNC: 0.2 MG/DL (ref 0–1.2)
BUN SERPL-MCNC: 8 MG/DL (ref 6–20)
BUN/CREAT SERPL: 8.4 (ref 7–25)
CALCIUM SPEC-SCNC: 10 MG/DL (ref 8.6–10.5)
CHLORIDE SERPL-SCNC: 106 MMOL/L (ref 98–107)
CHOLEST SERPL-MCNC: 167 MG/DL (ref 0–200)
CO2 SERPL-SCNC: 24.1 MMOL/L (ref 22–29)
CREAT SERPL-MCNC: 0.95 MG/DL (ref 0.76–1.27)
EGFRCR SERPLBLD CKD-EPI 2021: 113.2 ML/MIN/1.73
GLOBULIN UR ELPH-MCNC: 1.8 GM/DL
GLUCOSE SERPL-MCNC: 125 MG/DL (ref 65–99)
HDLC SERPL-MCNC: 34 MG/DL (ref 40–60)
LDLC SERPL CALC-MCNC: 83 MG/DL (ref 0–100)
LDLC/HDLC SERPL: 2.11 {RATIO}
POTASSIUM SERPL-SCNC: 4.3 MMOL/L (ref 3.5–5.2)
PROT SERPL-MCNC: 6.6 G/DL (ref 6–8.5)
SODIUM SERPL-SCNC: 142 MMOL/L (ref 136–145)
T4 FREE SERPL-MCNC: 1.36 NG/DL (ref 0.93–1.7)
TRIGL SERPL-MCNC: 306 MG/DL (ref 0–150)
TSH SERPL DL<=0.05 MIU/L-ACNC: 5.08 UIU/ML (ref 0.27–4.2)
VLDLC SERPL-MCNC: 50 MG/DL (ref 5–40)

## 2022-03-08 PROCEDURE — 80061 LIPID PANEL: CPT | Performed by: INTERNAL MEDICINE

## 2022-03-08 PROCEDURE — 84439 ASSAY OF FREE THYROXINE: CPT | Performed by: INTERNAL MEDICINE

## 2022-03-08 PROCEDURE — 80053 COMPREHEN METABOLIC PANEL: CPT | Performed by: INTERNAL MEDICINE

## 2022-03-08 PROCEDURE — 84443 ASSAY THYROID STIM HORMONE: CPT | Performed by: INTERNAL MEDICINE

## 2022-03-10 DIAGNOSIS — F32.1 CURRENT MODERATE EPISODE OF MAJOR DEPRESSIVE DISORDER WITHOUT PRIOR EPISODE: ICD-10-CM

## 2022-03-10 RX ORDER — BUPROPION HYDROCHLORIDE 300 MG/1
300 TABLET ORAL DAILY
Qty: 90 TABLET | Refills: 1 | Status: SHIPPED | OUTPATIENT
Start: 2022-03-10 | End: 2022-09-14 | Stop reason: SDUPTHER

## 2022-03-10 RX ORDER — LEVOTHYROXINE SODIUM 0.03 MG/1
25 TABLET ORAL DAILY
Qty: 90 TABLET | Refills: 0 | Status: SHIPPED | OUTPATIENT
Start: 2022-03-10 | End: 2022-05-27 | Stop reason: SDUPTHER

## 2022-05-04 DIAGNOSIS — E11.9 CONTROLLED TYPE 2 DIABETES MELLITUS WITHOUT COMPLICATION, WITHOUT LONG-TERM CURRENT USE OF INSULIN: ICD-10-CM

## 2022-05-05 NOTE — TELEPHONE ENCOUNTER
LIDIA and sent patient a my chart message letting him know he is due for a follow up on his diabetes.    LV: 1/6/22 telemedicine  NV: not schemamta  LL: A1C 1/12/22  LF: 1/16/22 60/2

## 2022-05-25 ENCOUNTER — OFFICE VISIT (OUTPATIENT)
Dept: INTERNAL MEDICINE | Facility: CLINIC | Age: 27
End: 2022-05-25

## 2022-05-25 ENCOUNTER — LAB (OUTPATIENT)
Dept: LAB | Facility: HOSPITAL | Age: 27
End: 2022-05-25

## 2022-05-25 VITALS
WEIGHT: 189 LBS | DIASTOLIC BLOOD PRESSURE: 80 MMHG | HEIGHT: 73 IN | SYSTOLIC BLOOD PRESSURE: 136 MMHG | BODY MASS INDEX: 25.05 KG/M2 | RESPIRATION RATE: 16 BRPM | TEMPERATURE: 98.1 F | OXYGEN SATURATION: 99 % | HEART RATE: 59 BPM

## 2022-05-25 DIAGNOSIS — F33.1 MODERATE EPISODE OF RECURRENT MAJOR DEPRESSIVE DISORDER: Chronic | ICD-10-CM

## 2022-05-25 DIAGNOSIS — E11.9 CONTROLLED TYPE 2 DIABETES MELLITUS WITHOUT COMPLICATION, WITHOUT LONG-TERM CURRENT USE OF INSULIN: Chronic | ICD-10-CM

## 2022-05-25 DIAGNOSIS — E78.1 HYPERTRIGLYCERIDEMIA: Chronic | ICD-10-CM

## 2022-05-25 DIAGNOSIS — E03.9 HYPOTHYROIDISM (ACQUIRED): Chronic | ICD-10-CM

## 2022-05-25 DIAGNOSIS — E03.9 HYPOTHYROIDISM (ACQUIRED): Primary | Chronic | ICD-10-CM

## 2022-05-25 PROCEDURE — 84439 ASSAY OF FREE THYROXINE: CPT | Performed by: INTERNAL MEDICINE

## 2022-05-25 PROCEDURE — 80061 LIPID PANEL: CPT | Performed by: INTERNAL MEDICINE

## 2022-05-25 PROCEDURE — 80053 COMPREHEN METABOLIC PANEL: CPT | Performed by: INTERNAL MEDICINE

## 2022-05-25 PROCEDURE — 84443 ASSAY THYROID STIM HORMONE: CPT | Performed by: INTERNAL MEDICINE

## 2022-05-25 PROCEDURE — 99214 OFFICE O/P EST MOD 30 MIN: CPT | Performed by: INTERNAL MEDICINE

## 2022-05-25 RX ORDER — LEVOTHYROXINE SODIUM 0.03 MG/1
25 TABLET ORAL DAILY
Qty: 90 TABLET | Refills: 0 | Status: CANCELLED | OUTPATIENT
Start: 2022-05-25

## 2022-05-25 NOTE — PROGRESS NOTES
Answers for HPI/ROS submitted by the patient on 5/18/2022  What is the primary reason for your visit?: Diabetes  Diabetes type: type 2  MedicAlert ID: No  Disease duration: 5 years  blurred vision: No  chest pain: No  fatigue: Yes  foot paresthesias: No  foot ulcerations: No  polydipsia: No  polyphagia: No  polyuria: No  visual change: No  weakness: No  weight loss: No  Symptom course: stable  confusion: No  dizziness: No  headaches: No  hunger: No  mood changes: No  nervous/anxious: No  pallor: No  seizures: No  sleepiness: No  speech difficulty: No  sweats: No  tremors: No  blackouts: No  hospitalization: No  nocturnal hypoglycemia: No  required assistance: No  required glucagon: No  CVA: No  heart disease: No  impotence: No  nephropathy: No  peripheral neuropathy: No  PVD: No  retinopathy: No  CAD risks: sedentary lifestyle, stress  Current treatments: oral agent (monotherapy)  Treatment compliance: all of the time  Home blood tests: 1-2 x per week  Monitoring compliance: inadequate  Blood glucose trend: no change  breakfast time: 8-9 am  breakfast glucose level:   lunch time: 12-1 pm  lunch glucose level: 110-130  dinner time: 5-6 pm  dinner glucose level: 110-130  High score: 140-180  Overall: 110-130  Weight trend: stable  Current diet: generally healthy  Meal planning: avoidance of concentrated sweets  Exercise: three times a week  Dietitian visit: No  Eye exam current: Yes  Sees podiatrist: No    Chief Complaint  Med Refill and Diabetes    Subjective          John Avelar presents to [facility]  History of Present Illness    DM type II- patient is on 500 mg twice a day.  Last A1c was 7.1 done 4 months ago. Diet has been good overall-avoids added sugar.  Has started lifting some weights but not much cardio    Hypothyroid- TSH was 5 in March.  We did start Synthroid 25.  He cannot tell if much difference.  Weight is stable    Depression-he is on Wellbutrin 300 mg and this  "is working well    Hypertriglyceridemia-triglycerides were checked 2 months ago and were elevated at 308.  LDL was 83.  He feels like diet is healthy-not a lot of fried foods or red meat    Elevated aminotransferases-his  ALT has been slightly elevated in the 45 range.     Current Outpatient Medications:   •  buPROPion XL (Wellbutrin XL) 300 MG 24 hr tablet, Take 1 tablet by mouth Daily., Disp: 90 tablet, Rfl: 1  •  levothyroxine (Synthroid) 25 MCG tablet, Take 1 tablet by mouth Daily. Take on empty stomach, Disp: 90 tablet, Rfl: 0  •  metFORMIN (GLUCOPHAGE) 500 MG tablet, TAKE 1 TABLET BY MOUTH TWICE DAILY WITH MEALS, Disp: 60 tablet, Rfl: 0      Objective   Vital Signs:   /80   Pulse 59   Temp 98.1 °F (36.7 °C)   Resp 16   Ht 184.4 cm (72.6\")   Wt 85.7 kg (189 lb)   SpO2 99%   BMI 25.21 kg/m²       Physical exam  Constitutional: oriented to person, place, and time.  well-developed and well-nourished. No distress.   HENT:   Head: Normocephalic and atraumatic.   Eyes: Conjunctivae and EOM are normal.   Cardiovascular: Normal rate, regular rhythm and normal heart sounds.  Exam reveals no gallop and no friction rub.    No murmur heard.  Pulmonary/Chest: Effort normal and breath sounds normal. No respiratory distress.   no wheezes.   Neurological:  alert and oriented to person, place, and time.   Skin: Skin is warm and dry. not diaphoretic.   Psychiatric:  normal mood and affect. behavior is normal. Judgment and thought content normal.    Foot exam - Normal sensation, no lesions, pulses 2+    Result Review :   The following data was reviewed by: Judy Ferguson MD on 05/25/2022:  CMP    CMP 1/12/22 3/8/22   Glucose 108 (A) 125 (A)   BUN 15 8   Creatinine 1.37 (A) 0.95   eGFR Non  Am 63    eGFR  Am 76    Sodium 143 142   Potassium 4.4 4.3   Chloride 102 106   Calcium 10.6 (A) 10.0   Albumin 5.30 (A) 4.80   Total Bilirubin 0.2 0.2   Alkaline Phosphatase 69 65   AST (SGOT) 27 19   ALT (SGPT) 46 " (A) 47 (A)   (A) Abnormal value            CBC    CBC 1/12/22   WBC 6.77   RBC 5.50   Hemoglobin 15.0   Hematocrit 44.4   MCV 80.7   MCH 27.3   MCHC 33.8   RDW 13.2   Platelets 247           Lipid Panel    Lipid Panel 1/12/22 3/8/22   Total Cholesterol 210 (A) 167   Triglycerides 572 (A) 306 (A)   HDL Cholesterol 34 (A) 34 (A)   VLDL Cholesterol 92 (A) 50 (A)   LDL Cholesterol  84 83   LDL/HDL Ratio 1.81 2.11   (A) Abnormal value            TSH    TSH 1/12/22 3/8/22   TSH 6.960 (A) 5.080 (A)   (A) Abnormal value            A1C Last 3 Results    HGBA1C Last 3 Results 1/12/22   Hemoglobin A1C 7.11 (A)   (A) Abnormal value            Microalbumin    Microalbumin 1/12/22   Microalbumin, Urine <1.2                        Assessment and Plan    Diagnoses and all orders for this visit:    1. Hypothyroidism (acquired) (Primary)-check levels today  -     T4, Free; Future  -     TSH; Future    2. Hypertriglyceridemia-recheck today  -     Comprehensive Metabolic Panel; Future  -     Lipid Panel; Future    3. Controlled type 2 diabetes mellitus without complication, without long-term current use of insulin (HCC)-check A1c.  Hemoglobin A1c; Future    4. Moderate episode of recurrent major depressive disorder (HCC)-stable on Wellbutrin        Follow Up   No follow-ups on file.  Patient was given instructions and counseling regarding his condition or for health maintenance advice. Please see specific information pulled into the AVS if appropriate.

## 2022-05-26 ENCOUNTER — LAB (OUTPATIENT)
Dept: LAB | Facility: HOSPITAL | Age: 27
End: 2022-05-26

## 2022-05-26 DIAGNOSIS — E11.9 CONTROLLED TYPE 2 DIABETES MELLITUS WITHOUT COMPLICATION, WITHOUT LONG-TERM CURRENT USE OF INSULIN: Chronic | ICD-10-CM

## 2022-05-26 LAB
ALBUMIN SERPL-MCNC: 4.6 G/DL (ref 3.5–5.2)
ALBUMIN/GLOB SERPL: 1.9 G/DL
ALP SERPL-CCNC: 56 U/L (ref 39–117)
ALT SERPL W P-5'-P-CCNC: 36 U/L (ref 1–41)
ANION GAP SERPL CALCULATED.3IONS-SCNC: 12.3 MMOL/L (ref 5–15)
AST SERPL-CCNC: 20 U/L (ref 1–40)
BILIRUB SERPL-MCNC: 0.3 MG/DL (ref 0–1.2)
BUN SERPL-MCNC: 9 MG/DL (ref 6–20)
BUN/CREAT SERPL: 8.5 (ref 7–25)
CALCIUM SPEC-SCNC: 9.9 MG/DL (ref 8.6–10.5)
CHLORIDE SERPL-SCNC: 106 MMOL/L (ref 98–107)
CHOLEST SERPL-MCNC: 195 MG/DL (ref 0–200)
CO2 SERPL-SCNC: 21.7 MMOL/L (ref 22–29)
CREAT SERPL-MCNC: 1.06 MG/DL (ref 0.76–1.27)
EGFRCR SERPLBLD CKD-EPI 2021: 99.3 ML/MIN/1.73
GLOBULIN UR ELPH-MCNC: 2.4 GM/DL
GLUCOSE SERPL-MCNC: 97 MG/DL (ref 65–99)
HDLC SERPL-MCNC: 41 MG/DL (ref 40–60)
LDLC SERPL CALC-MCNC: 128 MG/DL (ref 0–100)
LDLC/HDLC SERPL: 3.06 {RATIO}
POTASSIUM SERPL-SCNC: 4.3 MMOL/L (ref 3.5–5.2)
PROT SERPL-MCNC: 7 G/DL (ref 6–8.5)
SODIUM SERPL-SCNC: 140 MMOL/L (ref 136–145)
T4 FREE SERPL-MCNC: 1.39 NG/DL (ref 0.93–1.7)
TRIGL SERPL-MCNC: 142 MG/DL (ref 0–150)
TSH SERPL DL<=0.05 MIU/L-ACNC: 2.84 UIU/ML (ref 0.27–4.2)
VLDLC SERPL-MCNC: 26 MG/DL (ref 5–40)

## 2022-05-26 PROCEDURE — 83036 HEMOGLOBIN GLYCOSYLATED A1C: CPT | Performed by: INTERNAL MEDICINE

## 2022-05-27 DIAGNOSIS — E11.9 CONTROLLED TYPE 2 DIABETES MELLITUS WITHOUT COMPLICATION, WITHOUT LONG-TERM CURRENT USE OF INSULIN: ICD-10-CM

## 2022-05-27 LAB — HBA1C MFR BLD: 6.2 % (ref 4.8–5.6)

## 2022-05-27 RX ORDER — LEVOTHYROXINE SODIUM 0.03 MG/1
25 TABLET ORAL DAILY
Qty: 90 TABLET | Refills: 1 | Status: SHIPPED | OUTPATIENT
Start: 2022-05-27 | End: 2022-11-21 | Stop reason: SDUPTHER

## 2022-06-17 ENCOUNTER — PATIENT MESSAGE (OUTPATIENT)
Dept: INTERNAL MEDICINE | Facility: CLINIC | Age: 27
End: 2022-06-17

## 2022-06-17 DIAGNOSIS — Z11.1 SCREENING-PULMONARY TB: Primary | ICD-10-CM

## 2022-06-21 ENCOUNTER — LAB (OUTPATIENT)
Dept: LAB | Facility: HOSPITAL | Age: 27
End: 2022-06-21

## 2022-06-21 DIAGNOSIS — Z11.1 SCREENING-PULMONARY TB: ICD-10-CM

## 2022-06-21 PROCEDURE — 86480 TB TEST CELL IMMUN MEASURE: CPT | Performed by: INTERNAL MEDICINE

## 2022-06-23 LAB
GAMMA INTERFERON BACKGROUND BLD IA-ACNC: 0.01 IU/ML
M TB IFN-G BLD-IMP: NEGATIVE
M TB IFN-G CD4+ BCKGRND COR BLD-ACNC: 0.05 IU/ML
M TB IFN-G CD4+CD8+ BCKGRND COR BLD-ACNC: 0.05 IU/ML
MITOGEN IGNF BLD-ACNC: >10 IU/ML
QUANTIFERON INCUBATION: NORMAL
SERVICE CMNT-IMP: NORMAL

## 2022-09-13 DIAGNOSIS — F32.1 CURRENT MODERATE EPISODE OF MAJOR DEPRESSIVE DISORDER WITHOUT PRIOR EPISODE: ICD-10-CM

## 2022-09-14 DIAGNOSIS — F32.1 CURRENT MODERATE EPISODE OF MAJOR DEPRESSIVE DISORDER WITHOUT PRIOR EPISODE: ICD-10-CM

## 2022-09-14 RX ORDER — BUPROPION HYDROCHLORIDE 300 MG/1
300 TABLET ORAL DAILY
Qty: 90 TABLET | Refills: 0 | Status: SHIPPED | OUTPATIENT
Start: 2022-09-14 | End: 2022-11-21 | Stop reason: SDUPTHER

## 2022-09-14 RX ORDER — BUPROPION HYDROCHLORIDE 300 MG/1
TABLET ORAL
Qty: 90 TABLET | Refills: 0 | OUTPATIENT
Start: 2022-09-14

## 2022-11-21 DIAGNOSIS — E11.9 CONTROLLED TYPE 2 DIABETES MELLITUS WITHOUT COMPLICATION, WITHOUT LONG-TERM CURRENT USE OF INSULIN: ICD-10-CM

## 2022-11-21 DIAGNOSIS — F32.1 CURRENT MODERATE EPISODE OF MAJOR DEPRESSIVE DISORDER WITHOUT PRIOR EPISODE: ICD-10-CM

## 2022-11-21 NOTE — TELEPHONE ENCOUNTER
Caller: John Avelar    Relationship: Self    Best call back number: [phone]    Requested Prescriptions:   Requested Prescriptions     Pending Prescriptions Disp Refills   • levothyroxine (Synthroid) 25 MCG tablet 90 tablet 1     Sig: Take 1 tablet by mouth Daily. Take on empty stomach   • buPROPion XL (Wellbutrin XL) 300 MG 24 hr tablet 90 tablet 0     Sig: Take 1 tablet by mouth Daily.   • metFORMIN (GLUCOPHAGE) 500 MG tablet 180 tablet 1     Sig: Take 1 tablet by mouth 2 (Two) Times a Day With Meals.        Pharmacy where request should be sent: [address] - [phone]  - [phone]      Additional details provided by patient:     Does the patient have less than a 3 day supply:  [] Yes  [x] No    Doris Gallo Rep   11/21/22 15:12 EST

## 2022-11-22 RX ORDER — BUPROPION HYDROCHLORIDE 300 MG/1
300 TABLET ORAL DAILY
Qty: 30 TABLET | Refills: 0 | Status: SHIPPED | OUTPATIENT
Start: 2022-11-22 | End: 2022-12-14 | Stop reason: SDUPTHER

## 2022-11-22 RX ORDER — LEVOTHYROXINE SODIUM 0.03 MG/1
25 TABLET ORAL DAILY
Qty: 30 TABLET | Refills: 0 | Status: SHIPPED | OUTPATIENT
Start: 2022-11-22 | End: 2022-12-18 | Stop reason: SDUPTHER

## 2022-11-22 NOTE — TELEPHONE ENCOUNTER
LV: 5/25/22  NV: 12/14/22  LF: levothyroxine and metformin 6 months on 5/27/22; 9/14/22 bupropion 90/0.  LL: 5/26/22 & 5/25/22

## 2022-12-08 DIAGNOSIS — F32.1 CURRENT MODERATE EPISODE OF MAJOR DEPRESSIVE DISORDER WITHOUT PRIOR EPISODE: ICD-10-CM

## 2022-12-08 RX ORDER — BUPROPION HYDROCHLORIDE 300 MG/1
TABLET ORAL
Qty: 90 TABLET | Refills: 0 | OUTPATIENT
Start: 2022-12-08

## 2022-12-14 ENCOUNTER — LAB (OUTPATIENT)
Dept: LAB | Facility: HOSPITAL | Age: 27
End: 2022-12-14

## 2022-12-14 ENCOUNTER — OFFICE VISIT (OUTPATIENT)
Dept: INTERNAL MEDICINE | Facility: CLINIC | Age: 27
End: 2022-12-14

## 2022-12-14 VITALS
TEMPERATURE: 97.1 F | SYSTOLIC BLOOD PRESSURE: 122 MMHG | DIASTOLIC BLOOD PRESSURE: 78 MMHG | WEIGHT: 176 LBS | HEIGHT: 73 IN | HEART RATE: 73 BPM | BODY MASS INDEX: 23.33 KG/M2 | OXYGEN SATURATION: 98 %

## 2022-12-14 DIAGNOSIS — E11.9 CONTROLLED TYPE 2 DIABETES MELLITUS WITHOUT COMPLICATION, WITHOUT LONG-TERM CURRENT USE OF INSULIN: Chronic | ICD-10-CM

## 2022-12-14 DIAGNOSIS — F33.1 MODERATE EPISODE OF RECURRENT MAJOR DEPRESSIVE DISORDER: Chronic | ICD-10-CM

## 2022-12-14 DIAGNOSIS — E11.9 CONTROLLED TYPE 2 DIABETES MELLITUS WITHOUT COMPLICATION, WITHOUT LONG-TERM CURRENT USE OF INSULIN: Primary | Chronic | ICD-10-CM

## 2022-12-14 DIAGNOSIS — E03.9 HYPOTHYROIDISM (ACQUIRED): Chronic | ICD-10-CM

## 2022-12-14 LAB
DEPRECATED RDW RBC AUTO: 40.4 FL (ref 37–54)
ERYTHROCYTE [DISTWIDTH] IN BLOOD BY AUTOMATED COUNT: 14.1 % (ref 12.3–15.4)
EXPIRATION DATE: NORMAL
HBA1C MFR BLD: 6 %
HCT VFR BLD AUTO: 43.2 % (ref 37.5–51)
HGB BLD-MCNC: 14 G/DL (ref 13–17.7)
Lab: NORMAL
MCH RBC QN AUTO: 25.9 PG (ref 26.6–33)
MCHC RBC AUTO-ENTMCNC: 32.4 G/DL (ref 31.5–35.7)
MCV RBC AUTO: 79.9 FL (ref 79–97)
PLATELET # BLD AUTO: 249 10*3/MM3 (ref 140–450)
PMV BLD AUTO: 10 FL (ref 6–12)
RBC # BLD AUTO: 5.41 10*6/MM3 (ref 4.14–5.8)
WBC NRBC COR # BLD: 5.74 10*3/MM3 (ref 3.4–10.8)

## 2022-12-14 PROCEDURE — 80050 GENERAL HEALTH PANEL: CPT

## 2022-12-14 PROCEDURE — 3044F HG A1C LEVEL LT 7.0%: CPT | Performed by: INTERNAL MEDICINE

## 2022-12-14 PROCEDURE — 99214 OFFICE O/P EST MOD 30 MIN: CPT | Performed by: INTERNAL MEDICINE

## 2022-12-14 PROCEDURE — 80061 LIPID PANEL: CPT

## 2022-12-14 PROCEDURE — 83036 HEMOGLOBIN GLYCOSYLATED A1C: CPT | Performed by: INTERNAL MEDICINE

## 2022-12-14 RX ORDER — BUPROPION HYDROCHLORIDE 300 MG/1
300 TABLET ORAL DAILY
Qty: 90 TABLET | Refills: 1 | Status: SHIPPED | OUTPATIENT
Start: 2022-12-14

## 2022-12-14 RX ORDER — LEVOTHYROXINE SODIUM 0.03 MG/1
25 TABLET ORAL DAILY
Qty: 90 TABLET | Refills: 1 | Status: CANCELLED | OUTPATIENT
Start: 2022-12-14

## 2022-12-15 LAB
ALBUMIN SERPL-MCNC: 5.1 G/DL (ref 3.5–5.2)
ALBUMIN/GLOB SERPL: 3.6 G/DL
ALP SERPL-CCNC: 61 U/L (ref 39–117)
ALT SERPL W P-5'-P-CCNC: 18 U/L (ref 1–41)
ANION GAP SERPL CALCULATED.3IONS-SCNC: 11.7 MMOL/L (ref 5–15)
AST SERPL-CCNC: 18 U/L (ref 1–40)
BILIRUB SERPL-MCNC: 0.4 MG/DL (ref 0–1.2)
BUN SERPL-MCNC: 11 MG/DL (ref 6–20)
BUN/CREAT SERPL: 9.5 (ref 7–25)
CALCIUM SPEC-SCNC: 9.7 MG/DL (ref 8.6–10.5)
CHLORIDE SERPL-SCNC: 107 MMOL/L (ref 98–107)
CHOLEST SERPL-MCNC: 221 MG/DL (ref 0–200)
CO2 SERPL-SCNC: 25.3 MMOL/L (ref 22–29)
CREAT SERPL-MCNC: 1.16 MG/DL (ref 0.76–1.27)
EGFRCR SERPLBLD CKD-EPI 2021: 89.1 ML/MIN/1.73
GLOBULIN UR ELPH-MCNC: 1.4 GM/DL
GLUCOSE SERPL-MCNC: 96 MG/DL (ref 65–99)
HDLC SERPL-MCNC: 42 MG/DL (ref 40–60)
LDLC SERPL CALC-MCNC: 152 MG/DL (ref 0–100)
LDLC/HDLC SERPL: 3.56 {RATIO}
POTASSIUM SERPL-SCNC: 4.6 MMOL/L (ref 3.5–5.2)
PROT SERPL-MCNC: 6.5 G/DL (ref 6–8.5)
SODIUM SERPL-SCNC: 144 MMOL/L (ref 136–145)
TRIGL SERPL-MCNC: 147 MG/DL (ref 0–150)
TSH SERPL DL<=0.05 MIU/L-ACNC: 3.01 UIU/ML (ref 0.27–4.2)
VLDLC SERPL-MCNC: 27 MG/DL (ref 5–40)

## 2022-12-18 RX ORDER — LEVOTHYROXINE SODIUM 0.03 MG/1
25 TABLET ORAL DAILY
Qty: 90 TABLET | Refills: 3 | Status: SHIPPED | OUTPATIENT
Start: 2022-12-18

## 2023-01-26 ENCOUNTER — PATIENT MESSAGE (OUTPATIENT)
Dept: INTERNAL MEDICINE | Facility: CLINIC | Age: 28
End: 2023-01-26
Payer: MEDICAID

## 2023-01-26 DIAGNOSIS — Z11.1 SCREENING-PULMONARY TB: Primary | ICD-10-CM

## 2023-02-02 DIAGNOSIS — Z11.1 SCREENING-PULMONARY TB: ICD-10-CM

## 2023-03-23 DIAGNOSIS — E11.9 CONTROLLED TYPE 2 DIABETES MELLITUS WITHOUT COMPLICATION, WITHOUT LONG-TERM CURRENT USE OF INSULIN: Chronic | ICD-10-CM

## 2023-04-15 ENCOUNTER — PATIENT MESSAGE (OUTPATIENT)
Dept: INTERNAL MEDICINE | Facility: CLINIC | Age: 28
End: 2023-04-15
Payer: MEDICAID

## 2023-04-15 DIAGNOSIS — Z23 NEED FOR VACCINATION AGAINST HEPATITIS B VIRUS: Primary | ICD-10-CM

## 2023-04-15 DIAGNOSIS — Z78.9 MEASLES, MUMPS, RUBELLA (MMR) VACCINATION STATUS UNKNOWN: ICD-10-CM

## 2023-04-21 ENCOUNTER — LAB (OUTPATIENT)
Dept: LAB | Facility: HOSPITAL | Age: 28
End: 2023-04-21
Payer: MEDICAID

## 2023-04-21 DIAGNOSIS — Z23 NEED FOR VACCINATION AGAINST HEPATITIS B VIRUS: ICD-10-CM

## 2023-04-21 DIAGNOSIS — Z78.9 MEASLES, MUMPS, RUBELLA (MMR) VACCINATION STATUS UNKNOWN: ICD-10-CM

## 2023-04-21 PROCEDURE — 86762 RUBELLA ANTIBODY: CPT

## 2023-04-21 PROCEDURE — 86706 HEP B SURFACE ANTIBODY: CPT

## 2023-04-21 PROCEDURE — 86787 VARICELLA-ZOSTER ANTIBODY: CPT

## 2023-04-21 PROCEDURE — 86765 RUBEOLA ANTIBODY: CPT

## 2023-04-21 PROCEDURE — 86735 MUMPS ANTIBODY: CPT

## 2023-04-22 LAB
HBV SURFACE AB SER RIA-ACNC: REACTIVE
MEV IGG SER IA-ACNC: <13.5 AU/ML
MUV IGG SER IA-ACNC: <9 AU/ML
RUBV IGG SERPL IA-ACNC: 4.09 INDEX
VZV IGG SER IA-ACNC: <135 INDEX

## 2023-05-01 ENCOUNTER — TELEPHONE (OUTPATIENT)
Dept: INTERNAL MEDICINE | Facility: CLINIC | Age: 28
End: 2023-05-01

## 2023-05-01 DIAGNOSIS — Z23 NEED FOR VACCINATION: Primary | ICD-10-CM

## 2023-05-01 NOTE — TELEPHONE ENCOUNTER
THE PATIENT CALLED IN I RELAYED THE HUB TO READ MESSAGE THE PATIENT SAID THAT HE WOULD COME IN THIS Thursday TO HAVE THE VACCINES

## 2023-05-01 NOTE — TELEPHONE ENCOUNTER
----- Message from JOHN Sullivan sent at 5/1/2023 12:35 PM EDT -----  Please let the patient know that blood work does not show immunity to rubeola, mumps, or varicella.  We will need to arrange MMR and varicella vaccination/boosters

## 2023-05-01 NOTE — TELEPHONE ENCOUNTER
Called and lvm for patient to return call, office number given.     HUB: if patient returns call please relay providers message and let him know that he can have these immunizations given as a nurse visit Monday-Thursday 8:00-4:00 and Friday 8:00-12:00 (no nurse visits from 12:00-1:00).     ----- Message from JOHN Sullivan sent at 5/1/2023 12:35 PM EDT -----  Please let the patient know that blood work does not show immunity to rubeola, mumps, or varicella.  We will need to arrange MMR and varicella vaccination/boosters

## 2023-05-04 ENCOUNTER — CLINICAL SUPPORT (OUTPATIENT)
Dept: INTERNAL MEDICINE | Facility: CLINIC | Age: 28
End: 2023-05-04
Payer: MEDICAID

## 2023-05-04 DIAGNOSIS — Z23 IMMUNIZATION DUE: ICD-10-CM

## 2023-05-04 PROCEDURE — 90707 MMR VACCINE SC: CPT | Performed by: NURSE PRACTITIONER

## 2023-05-04 PROCEDURE — 90471 IMMUNIZATION ADMIN: CPT | Performed by: NURSE PRACTITIONER

## 2023-06-14 ENCOUNTER — TELEMEDICINE (OUTPATIENT)
Dept: INTERNAL MEDICINE | Facility: CLINIC | Age: 28
End: 2023-06-14
Payer: MEDICAID

## 2023-06-14 DIAGNOSIS — F33.1 MODERATE EPISODE OF RECURRENT MAJOR DEPRESSIVE DISORDER: Chronic | ICD-10-CM

## 2023-06-14 DIAGNOSIS — E11.9 CONTROLLED TYPE 2 DIABETES MELLITUS WITHOUT COMPLICATION, WITHOUT LONG-TERM CURRENT USE OF INSULIN: Primary | Chronic | ICD-10-CM

## 2023-06-14 DIAGNOSIS — E11.9 CONTROLLED TYPE 2 DIABETES MELLITUS WITHOUT COMPLICATION, WITHOUT LONG-TERM CURRENT USE OF INSULIN: Chronic | ICD-10-CM

## 2023-06-14 DIAGNOSIS — Z01.84 IMMUNITY STATUS TESTING: ICD-10-CM

## 2023-06-14 DIAGNOSIS — F33.0 MILD EPISODE OF RECURRENT MAJOR DEPRESSIVE DISORDER: Chronic | ICD-10-CM

## 2023-06-14 DIAGNOSIS — E03.9 HYPOTHYROIDISM (ACQUIRED): Chronic | ICD-10-CM

## 2023-06-14 DIAGNOSIS — Z23 IMMUNIZATION DUE: ICD-10-CM

## 2023-06-14 DIAGNOSIS — E78.1 HYPERTRIGLYCERIDEMIA: Chronic | ICD-10-CM

## 2023-06-14 RX ORDER — BUPROPION HYDROCHLORIDE 300 MG/1
300 TABLET ORAL DAILY
Qty: 90 TABLET | Refills: 3 | Status: SHIPPED | OUTPATIENT
Start: 2023-06-14

## 2023-06-14 NOTE — PROGRESS NOTES
Chief Complaint  No chief complaint on file.    Ena Avelar presents to [facility]  History of Present Illness    Video Visit was done today.  patient has consented to receive care via Video Visit. He is located in [place], [place] and I am in office in [place], [place]    DM type II-patient is taking 1 metformin a day and does check his sugars intermittently and fasting readings are generally ranging between 80 and 120.  After meals can get up to 170 through 180.  He does feel like he is eating a healthy diet and following a low-carb diet.  He has not been able to exercise much as he is studying for [employer] which he will take next month.  He reports his weight is stable between 175 and 180 pounds  He does occasionally have episodes where he feels slightly dizzy that only last for a few minutes.  He will check blood sugar and generally is normal and blood pressure also normal    Depression-patient feels like Wellbutrin is working well and is controlling his depression and he would like to stay on it    Hypothyroid-patient is taking Synthroid 25 regularly    His antibody levels to MMR and varicella were low so he did receive both vaccinations last month and would like recheck on the antibody levels    Current Outpatient Medications:     buPROPion XL (Wellbutrin XL) 300 MG 24 hr tablet, Take 1 tablet by mouth Daily., Disp: 90 tablet, Rfl: 1    levothyroxine (Synthroid) 25 MCG tablet, Take 1 tablet by mouth Daily. Take on empty stomach, Disp: 90 tablet, Rfl: 3    metFORMIN (GLUCOPHAGE) 500 MG tablet, Take 1 tablet by mouth once daily with food, Disp: 90 tablet, Rfl: 0   The following portions of the patient's history were reviewed and updated as appropriate: allergies, current medications, past family history, past medical history, past social history, past surgical history and problem list.     Objective   Vital Signs:   There were no vitals taken for this visit.       Physical exam  Constitutional: oriented to person, place, and time.  well-developed and well-nourished. No distress.   HENT:   Head: Normocephalic and atraumatic.   Eyes: Conjunctivae and EOM are normal.   Neurological:  alert and oriented to person, place, and time.   Skin: Skin is warm and dry. not diaphoretic.   Psychiatric:  normal mood and affect. behavior is normal. Judgment and thought content normal.      Result Review :   The following data was reviewed by: Judy Ferguson MD on 06/14/2023:  Common labs          12/14/2022    15:43 12/14/2022    16:03   Common Labs   Glucose  96    BUN  11    Creatinine  1.16    Sodium  144    Potassium  4.6    Chloride  107    Calcium  9.7    Albumin  5.10    Total Bilirubin  0.4    Alkaline Phosphatase  61    AST (SGOT)  18    ALT (SGPT)  18    WBC  5.74    Hemoglobin  14.0    Hematocrit  43.2    Platelets  249    Total Cholesterol  221    Triglycerides  147    HDL Cholesterol  42    LDL Cholesterol   152    Hemoglobin A1C 6.0       A1C Last 3 Results          12/14/2022    15:43   HGBA1C Last 3 Results   Hemoglobin A1C 6.0                  Assessment and Plan    Diagnoses and all orders for this visit:    1. Controlled type 2 diabetes mellitus without complication, without long-term current use of insulin (HCC) (Primary)-we will check an A1c when he is back in [place].  Lab order put in  -     CBC (No Diff); Future  -     Hemoglobin A1c; Future  -     Microalbumin / Creatinine Urine Ratio - Urine, Clean Catch; Future    2. Hypothyroidism (acquired)-recheck thyroid levels  -     TSH Rfx On Abnormal To Free T4; Future    3. Hypertriglyceridemia-recheck triglycerides  -     Lipid Panel; Future  -     Comprehensive Metabolic Panel; Future    4. Mild episode of recurrent major depressive disorder-stable on Wellbutrin    5. Immunization due  -     Varicella Zoster Antibody, IgG; Future  -     Measles / Mumps / Rubella Immunity; Future    6. Immunity status testing  -      Varicella Zoster Antibody, IgG; Future  -     Measles / Mumps / Rubella Immunity; Future        Follow Up   No follow-ups on file.  Patient was given instructions and counseling regarding his condition or for health maintenance advice. Please see specific information pulled into the AVS if appropriate.

## 2023-09-09 NOTE — PROGRESS NOTES
Answers for HPI/ROS submitted by the patient on 12/7/2022  What is the primary reason for your visit?: Diabetes  Diabetes type: type 2  MedicAlert ID: No  Disease duration: 5 Years  blurred vision: No  chest pain: No  fatigue: Yes  foot paresthesias: No  foot ulcerations: No  polydipsia: No  polyphagia: No  polyuria: No  visual change: No  weakness: No  weight loss: Yes  Symptom course: resolved  confusion: No  dizziness: Yes  headaches: No  hunger: No  mood changes: No  nervous/anxious: No  pallor: No  seizures: No  sleepiness: No  speech difficulty: No  sweats: No  tremors: No  blackouts: No  hospitalization: No  nocturnal hypoglycemia: No  required assistance: No  required glucagon: No  CVA: No  heart disease: No  impotence: No  nephropathy: No  peripheral neuropathy: No  PVD: No  retinopathy: No  CAD risks: stress  Current treatments: diet, oral agent (monotherapy)  Treatment compliance: all of the time  Home blood tests: 1-2 x per week  Home urines: <1 x per month  Monitoring compliance: adequate  Blood glucose trend: no change  breakfast time: 6-7 am  breakfast glucose level:   lunch time: 12-1 pm  lunch glucose level:   dinner time: 5-6 pm  dinner glucose level: 110-130  High score: 140-180  Overall: 110-130  Weight trend: decreasing steadily  Current diet: generally healthy, low fiber  Meal planning: avoidance of concentrated sweets  Exercise: three times a week  Dietitian visit: No  Eye exam current: Yes  Sees podiatrist: No    Chief Complaint  Diabetes (He has been exercising a lot lately and has lost weight.  He has felt like he had some hypoglycemic episodes.), Hypothyroidism, Depression, and Med Refill    Subjective          John Avelar presents to [facility]  History of Present Illness    DM type II- patient is on metformin 500 mg twice a day.  Last A1c was 6.2 done 7 months ago. Diet has been good overall- and he is lifting weights regularly and doing some  "high intensity cardio and has lost weight intentionally.  He did have a hypoglycemic episode in the 60s several weeks ago.  He had had coffee and waffles for breakfast and episode occurred for 5 hours later.  He has tried to improve diet with more protein     Hypothyroid- TSH was 5 in March.  We did start Synthroid 25 earlier this year and follow-up TSH was 2.8 done 7 months ago. He can't really tell much difference being on the Synthroid and is wondering if he should stay on it.  No family history of thyroid disease     Depression-he is on Wellbutrin 300 mg and this is working well     Elevated aminotransferases-his  ALT has been slightly elevated in the 45 range but repeat done 7 months ago was normal        Current Outpatient Medications:   •  buPROPion XL (Wellbutrin XL) 300 MG 24 hr tablet, Take 1 tablet by mouth Daily., Disp: 90 tablet, Rfl: 1  •  levothyroxine (Synthroid) 25 MCG tablet, Take 1 tablet by mouth Daily. Take on empty stomach, Disp: 30 tablet, Rfl: 0  •  metFORMIN (GLUCOPHAGE) 500 MG tablet, 1 qd with food, Disp: 90 tablet, Rfl: 0   The following portions of the patient's history were reviewed and updated as appropriate: allergies, current medications, past family history, past medical history, past social history, past surgical history and problem list.     Objective   Vital Signs:   /78 (BP Location: Right arm, Patient Position: Sitting)   Pulse 73   Temp 97.1 °F (36.2 °C) (Infrared)   Ht 184.4 cm (72.6\")   Wt 79.8 kg (176 lb)   SpO2 98%   BMI 23.48 kg/m²       Physical exam  Constitutional: oriented to person, place, and time.  well-developed and well-nourished. No distress.   HENT:   Head: Normocephalic and atraumatic.   Eyes: Conjunctivae and EOM are normal.   Neck: No thyroid nodules appreciated today or thyroid tenderness  Cardiovascular: Normal rate, regular rhythm and normal heart sounds.  Exam reveals no gallop and no friction rub.    No murmur heard.  Pulmonary/Chest: Effort " normal and breath sounds normal. No respiratory distress.   no wheezes.   Neurological:  alert and oriented to person, place, and time.   Skin: Skin is warm and dry. not diaphoretic.   Psychiatric:  normal mood and affect. behavior is normal. Judgment and thought content normal.      Result Review :   The following data was reviewed by: Judy Ferguson MD on 12/14/2022:  CMP    CMP 1/12/22 3/8/22 5/25/22   Glucose 108 (A) 125 (A) 97   BUN 15 8 9   Creatinine 1.37 (A) 0.95 1.06   eGFR Non  Am 63     eGFR  Am 76     Sodium 143 142 140   Potassium 4.4 4.3 4.3   Chloride 102 106 106   Calcium 10.6 (A) 10.0 9.9   Albumin 5.30 (A) 4.80 4.60   Total Bilirubin 0.2 0.2 0.3   Alkaline Phosphatase 69 65 56   AST (SGOT) 27 19 20   ALT (SGPT) 46 (A) 47 (A) 36   (A) Abnormal value            CBC    CBC 1/12/22   WBC 6.77   RBC 5.50   Hemoglobin 15.0   Hematocrit 44.4   MCV 80.7   MCH 27.3   MCHC 33.8   RDW 13.2   Platelets 247           Lipid Panel    Lipid Panel 1/12/22 3/8/22 5/25/22   Total Cholesterol 210 (A) 167 195   Triglycerides 572 (A) 306 (A) 142   HDL Cholesterol 34 (A) 34 (A) 41   VLDL Cholesterol 92 (A) 50 (A) 26   LDL Cholesterol  84 83 128 (A)   LDL/HDL Ratio 1.81 2.11 3.06   (A) Abnormal value            TSH    TSH 1/12/22 3/8/22 5/25/22   TSH 6.960 (A) 5.080 (A) 2.840   (A) Abnormal value            A1C Last 3 Results    HGBA1C Last 3 Results 1/12/22 5/26/22 12/14/22   Hemoglobin A1C 7.11 (A) 6.20 (A) 6.0   (A) Abnormal value            Microalbumin    Microalbumin 1/12/22   Microalbumin, Urine <1.2                        Assessment and Plan    Diagnoses and all orders for this visit:    1. Controlled type 2 diabetes mellitus without complication, without long-term current use of insulin (HCC) (Primary)  -     POC Glycosylated Hemoglobin (Hb A1C)  -     metFORMIN (GLUCOPHAGE) 500 MG tablet; 1 qd with food  Dispense: 90 tablet; Refill: 0  -     CBC (No Diff); Future  -     Lipid Panel; Future  -      Comprehensive Metabolic Panel; Future    2. Hypothyroidism (acquired)- we will check thyroid levels with his blood work and decide whether to continue or not  -     TSH Rfx On Abnormal To Free T4; Future    3. Moderate episode of recurrent major depressive disorder (HCC)-stable on Wellbutrin 300  -     buPROPion XL (Wellbutrin XL) 300 MG 24 hr tablet; Take 1 tablet by mouth Daily.  Dispense: 90 tablet; Refill: 1        Follow Up   Return in about 6 months (around 6/14/2023) for Next scheduled follow up.  Patient was given instructions and counseling regarding his condition or for health maintenance advice. Please see specific information pulled into the AVS if appropriate.        Name band;

## 2023-09-16 DIAGNOSIS — E11.9 CONTROLLED TYPE 2 DIABETES MELLITUS WITHOUT COMPLICATION, WITHOUT LONG-TERM CURRENT USE OF INSULIN: Chronic | ICD-10-CM

## 2023-09-18 NOTE — TELEPHONE ENCOUNTER
LV: 6/14/23 telemedicine  NV: not scheduled - when does he need an appointment?  LF: 6/14/23 90/0  LL:  A1C 6/19/23 6.10

## 2023-09-19 NOTE — TELEPHONE ENCOUNTER
I have left a voicemail on patient's phone and sent him a my chart message asking him to schedule a follow up in December.

## 2023-11-02 ENCOUNTER — TELEPHONE (OUTPATIENT)
Dept: INTERNAL MEDICINE | Facility: CLINIC | Age: 28
End: 2023-11-02
Payer: MEDICAID

## 2023-11-02 NOTE — TELEPHONE ENCOUNTER
Caller: John Avelar    Relationship: Self    Best call back number: [phone]     What form or medical record are you requesting: RESULTS FROM [employer] TB BLOOD TEST     Who is requesting this form or medical record from you: PATIENT     How would you like to receive the form or medical records (pick-up, mail, fax): PICK-UP   If fax, what is the fax number:   If mail, what is the address:   If pick-up, provide patient with address and location details    Timeframe paperwork needed: AS SOON AS POSSIBLE     Additional notes: PATIENT IS NEEDING THE RESULTS TO TAKE TO SCHOOL.

## 2023-11-30 ENCOUNTER — LAB (OUTPATIENT)
Dept: INTERNAL MEDICINE | Facility: CLINIC | Age: 28
End: 2023-11-30
Payer: MEDICAID

## 2023-11-30 ENCOUNTER — OFFICE VISIT (OUTPATIENT)
Dept: INTERNAL MEDICINE | Facility: CLINIC | Age: 28
End: 2023-11-30
Payer: MEDICAID

## 2023-11-30 VITALS
DIASTOLIC BLOOD PRESSURE: 70 MMHG | TEMPERATURE: 97.3 F | BODY MASS INDEX: 25.18 KG/M2 | HEIGHT: 73 IN | SYSTOLIC BLOOD PRESSURE: 126 MMHG | WEIGHT: 190 LBS | OXYGEN SATURATION: 98 % | HEART RATE: 76 BPM

## 2023-11-30 DIAGNOSIS — E03.9 HYPOTHYROIDISM (ACQUIRED): Chronic | ICD-10-CM

## 2023-11-30 DIAGNOSIS — E11.9 CONTROLLED TYPE 2 DIABETES MELLITUS WITHOUT COMPLICATION, WITHOUT LONG-TERM CURRENT USE OF INSULIN: ICD-10-CM

## 2023-11-30 DIAGNOSIS — E78.1 HYPERTRIGLYCERIDEMIA: Chronic | ICD-10-CM

## 2023-11-30 DIAGNOSIS — E11.9 CONTROLLED TYPE 2 DIABETES MELLITUS WITHOUT COMPLICATION, WITHOUT LONG-TERM CURRENT USE OF INSULIN: Primary | ICD-10-CM

## 2023-11-30 LAB
EXPIRATION DATE: 0
HBA1C MFR BLD: 7.2 % (ref 4.5–5.7)
Lab: 0

## 2023-11-30 PROCEDURE — 80061 LIPID PANEL: CPT | Performed by: INTERNAL MEDICINE

## 2023-11-30 PROCEDURE — 80050 GENERAL HEALTH PANEL: CPT | Performed by: INTERNAL MEDICINE

## 2023-11-30 RX ORDER — METFORMIN HYDROCHLORIDE 500 MG/1
TABLET, EXTENDED RELEASE ORAL
Qty: 60 TABLET | Refills: 5 | Status: SHIPPED | OUTPATIENT
Start: 2023-11-30

## 2023-11-30 RX ORDER — LEVOTHYROXINE SODIUM 0.03 MG/1
25 TABLET ORAL DAILY
Qty: 90 TABLET | Refills: 3 | Status: CANCELLED | OUTPATIENT
Start: 2023-11-30

## 2023-11-30 NOTE — PROGRESS NOTES
"Chief Complaint  Diabetes (F/u) and Hypothyroidism    Subjective          John Avelar presents to [facility]  History of Present Illness    Hypothyroid-he has felt well overall but has gained some weight    DMT2- he has not been eating as healthy and has not been as active.  Weight is up.  He is taking metformin regularly.  Last A1c done 5 months ago was 6.1    Depression-she is on Wellbutrin.  He feels like he is doing well and would like to stay on current dose      Current Outpatient Medications:     buPROPion XL (Wellbutrin XL) 300 MG 24 hr tablet, Take 1 tablet by mouth Daily., Disp: 90 tablet, Rfl: 3    levothyroxine (Synthroid) 25 MCG tablet, Take 1 tablet by mouth Daily. Take on empty stomach, Disp: 90 tablet, Rfl: 3    metFORMIN ER (GLUCOPHAGE-XR) 500 MG 24 hr tablet, 2 daily with food, Disp: 60 tablet, Rfl: 5   The following portions of the patient's history were reviewed and updated as appropriate: allergies, current medications, past family history, past medical history, past social history, past surgical history and problem list.     Objective   Vital Signs:   /70 (BP Location: Right arm, Patient Position: Sitting)   Pulse 76   Temp 97.3 °F (36.3 °C) (Infrared)   Ht 184.4 cm (72.6\")   Wt 86.2 kg (190 lb)   SpO2 98%   BMI 25.34 kg/m²       Physical exam  Constitutional: oriented to person, place, and time.  well-developed and well-nourished. No distress.   HENT:   Head: Normocephalic and atraumatic.   Eyes: Conjunctivae and EOM are normal.   Cardiovascular: Normal rate, regular rhythm and normal heart sounds.  Exam reveals no gallop and no friction rub.    No murmur heard.  Pulmonary/Chest: Effort normal and breath sounds normal. No respiratory distress.   no wheezes.   Neurological:  alert and oriented to person, place, and time.   Skin: Skin is warm and dry. not diaphoretic.   Psychiatric:  normal mood and affect. behavior is normal. Judgment and thought " content normal.      Result Review :   The following data was reviewed by: Judy Ferguson MD on 11/30/2023:  CMP          12/14/2022    16:03 6/19/2023    10:53   CMP   Glucose 96  118    BUN 11  14    Creatinine 1.16  1.04    EGFR 89.1  100.9    Sodium 144  141    Potassium 4.6  4.4    Chloride 107  107    Calcium 9.7  9.8    Total Protein 6.5  6.9    Albumin 5.10  4.8    Globulin 1.4  2.1    Total Bilirubin 0.4  0.3    Alkaline Phosphatase 61  53    AST (SGOT) 18  18    ALT (SGPT) 18  25    Albumin/Globulin Ratio 3.6  2.3    BUN/Creatinine Ratio 9.5  13.5    Anion Gap 11.7  12.0      CBC          12/14/2022    16:03 6/19/2023    10:53   CBC   WBC 5.74  5.12    RBC 5.41  5.46    Hemoglobin 14.0  14.6    Hematocrit 43.2  44.1    MCV 79.9  80.8    MCH 25.9  26.7    MCHC 32.4  33.1    RDW 14.1  13.8    Platelets 249  220      Lipid Panel          12/14/2022    16:03 6/19/2023    10:53   Lipid Panel   Total Cholesterol 221  197    Triglycerides 147  240    HDL Cholesterol 42  38    VLDL Cholesterol 27  42    LDL Cholesterol  152  117    LDL/HDL Ratio 3.56  2.92      TSH          12/14/2022    16:03 6/19/2023    10:53   TSH   TSH 3.010  3.410      A1C Last 3 Results          12/14/2022    15:43 6/19/2023    10:53   HGBA1C Last 3 Results   Hemoglobin A1C 6.0  6.10      Microalbumin          6/19/2023    10:53   Microalbumin   Microalbumin, Urine <1.2                  Assessment and Plan    Diagnoses and all orders for this visit:    1. Controlled type 2 diabetes mellitus without complication, without long-term current use of insulin (Primary)-A1c is up some.  We will raise the metformin to 2 daily.  He will work on healthier lifestyle as well  -     POC Glycosylated Hemoglobin (Hb A1C)  -     CBC (No Diff); Future  -     TSH Rfx On Abnormal To Free T4; Future  -     Lipid Panel; Future  -     Comprehensive Metabolic Panel; Future  -     metFORMIN ER (GLUCOPHAGE-XR) 500 MG 24 hr tablet; 2 daily with food  Dispense: 60  tablet; Refill: 5    2. Hypertriglyceridemia-recheck levels  -     CBC (No Diff); Future  -     Lipid Panel; Future  -     Comprehensive Metabolic Panel; Future    3. Hypothyroidism (acquired)-recheck levels  -     TSH Rfx On Abnormal To Free T4; Future        Follow Up   Return in about 3 months (around 2/29/2024) for Next scheduled follow up.  Patient was given instructions and counseling regarding his condition or for health maintenance advice. Please see specific information pulled into the AVS if appropriate.       Answers submitted by the patient for this visit:  Primary Reason for Visit (Submitted on 11/30/2023)  What is the primary reason for your visit?: Diabetes

## 2023-12-01 LAB
ALBUMIN SERPL-MCNC: 4.7 G/DL (ref 3.5–5.2)
ALBUMIN/GLOB SERPL: 2 G/DL
ALP SERPL-CCNC: 67 U/L (ref 39–117)
ALT SERPL W P-5'-P-CCNC: 34 U/L (ref 1–41)
ANION GAP SERPL CALCULATED.3IONS-SCNC: 12.9 MMOL/L (ref 5–15)
AST SERPL-CCNC: 22 U/L (ref 1–40)
BILIRUB SERPL-MCNC: 0.3 MG/DL (ref 0–1.2)
BUN SERPL-MCNC: 14 MG/DL (ref 6–20)
BUN/CREAT SERPL: 11 (ref 7–25)
CALCIUM SPEC-SCNC: 10 MG/DL (ref 8.6–10.5)
CHLORIDE SERPL-SCNC: 105 MMOL/L (ref 98–107)
CHOLEST SERPL-MCNC: 185 MG/DL (ref 0–200)
CO2 SERPL-SCNC: 23.1 MMOL/L (ref 22–29)
CREAT SERPL-MCNC: 1.27 MG/DL (ref 0.76–1.27)
DEPRECATED RDW RBC AUTO: 37.2 FL (ref 37–54)
EGFRCR SERPLBLD CKD-EPI 2021: 79.4 ML/MIN/1.73
ERYTHROCYTE [DISTWIDTH] IN BLOOD BY AUTOMATED COUNT: 13.3 % (ref 12.3–15.4)
GLOBULIN UR ELPH-MCNC: 2.3 GM/DL
GLUCOSE SERPL-MCNC: 115 MG/DL (ref 65–99)
HCT VFR BLD AUTO: 42.5 % (ref 37.5–51)
HDLC SERPL-MCNC: 35 MG/DL (ref 40–60)
HGB BLD-MCNC: 14.5 G/DL (ref 13–17.7)
LDLC SERPL CALC-MCNC: 108 MG/DL (ref 0–100)
LDLC/HDLC SERPL: 2.91 {RATIO}
MCH RBC QN AUTO: 26.9 PG (ref 26.6–33)
MCHC RBC AUTO-ENTMCNC: 34.1 G/DL (ref 31.5–35.7)
MCV RBC AUTO: 78.8 FL (ref 79–97)
PLATELET # BLD AUTO: 246 10*3/MM3 (ref 140–450)
PMV BLD AUTO: 9.8 FL (ref 6–12)
POTASSIUM SERPL-SCNC: 4.5 MMOL/L (ref 3.5–5.2)
PROT SERPL-MCNC: 7 G/DL (ref 6–8.5)
RBC # BLD AUTO: 5.39 10*6/MM3 (ref 4.14–5.8)
SODIUM SERPL-SCNC: 141 MMOL/L (ref 136–145)
TRIGL SERPL-MCNC: 241 MG/DL (ref 0–150)
TSH SERPL DL<=0.05 MIU/L-ACNC: 3.38 UIU/ML (ref 0.27–4.2)
VLDLC SERPL-MCNC: 42 MG/DL (ref 5–40)
WBC NRBC COR # BLD AUTO: 4.95 10*3/MM3 (ref 3.4–10.8)

## 2023-12-03 DIAGNOSIS — R71.8 MICROCYTOSIS: Primary | ICD-10-CM

## 2023-12-04 RX ORDER — LEVOTHYROXINE SODIUM 0.03 MG/1
25 TABLET ORAL DAILY
Qty: 90 TABLET | Refills: 3 | Status: SHIPPED | OUTPATIENT
Start: 2023-12-04

## 2023-12-29 RX ORDER — LEVOTHYROXINE SODIUM 0.03 MG/1
25 TABLET ORAL DAILY
Qty: 90 TABLET | Refills: 0 | OUTPATIENT
Start: 2023-12-29

## 2024-03-06 ENCOUNTER — OFFICE VISIT (OUTPATIENT)
Dept: INTERNAL MEDICINE | Facility: CLINIC | Age: 29
End: 2024-03-06
Payer: MEDICAID

## 2024-03-06 ENCOUNTER — LAB (OUTPATIENT)
Dept: INTERNAL MEDICINE | Facility: CLINIC | Age: 29
End: 2024-03-06
Payer: MEDICAID

## 2024-03-06 VITALS
TEMPERATURE: 97.8 F | WEIGHT: 190.8 LBS | OXYGEN SATURATION: 98 % | HEART RATE: 78 BPM | BODY MASS INDEX: 25.29 KG/M2 | RESPIRATION RATE: 20 BRPM | HEIGHT: 73 IN | DIASTOLIC BLOOD PRESSURE: 78 MMHG | SYSTOLIC BLOOD PRESSURE: 128 MMHG

## 2024-03-06 DIAGNOSIS — E03.9 HYPOTHYROIDISM (ACQUIRED): Chronic | ICD-10-CM

## 2024-03-06 DIAGNOSIS — E78.1 HYPERTRIGLYCERIDEMIA: Chronic | ICD-10-CM

## 2024-03-06 DIAGNOSIS — R71.8 MICROCYTOSIS: ICD-10-CM

## 2024-03-06 DIAGNOSIS — E11.9 CONTROLLED TYPE 2 DIABETES MELLITUS WITHOUT COMPLICATION, WITHOUT LONG-TERM CURRENT USE OF INSULIN: Primary | Chronic | ICD-10-CM

## 2024-03-06 DIAGNOSIS — F33.1 MODERATE EPISODE OF RECURRENT MAJOR DEPRESSIVE DISORDER: Chronic | ICD-10-CM

## 2024-03-06 LAB
ALBUMIN SERPL-MCNC: 4.9 G/DL (ref 3.5–5.2)
ALBUMIN/GLOB SERPL: 2.6 G/DL
ALP SERPL-CCNC: 79 U/L (ref 39–117)
ALT SERPL W P-5'-P-CCNC: 53 U/L (ref 1–41)
ANION GAP SERPL CALCULATED.3IONS-SCNC: 14 MMOL/L (ref 5–15)
AST SERPL-CCNC: 28 U/L (ref 1–40)
BILIRUB SERPL-MCNC: 0.3 MG/DL (ref 0–1.2)
BUN SERPL-MCNC: 12 MG/DL (ref 6–20)
BUN/CREAT SERPL: 10.2 (ref 7–25)
CALCIUM SPEC-SCNC: 9.7 MG/DL (ref 8.6–10.5)
CHLORIDE SERPL-SCNC: 105 MMOL/L (ref 98–107)
CHOLEST SERPL-MCNC: 198 MG/DL (ref 0–200)
CO2 SERPL-SCNC: 20 MMOL/L (ref 22–29)
CREAT SERPL-MCNC: 1.18 MG/DL (ref 0.76–1.27)
EGFRCR SERPLBLD CKD-EPI 2021: 86.2 ML/MIN/1.73
EXPIRATION DATE: ABNORMAL
GLOBULIN UR ELPH-MCNC: 1.9 GM/DL
GLUCOSE SERPL-MCNC: 201 MG/DL (ref 65–99)
HBA1C MFR BLD: 7.4 % (ref 4.5–5.7)
HDLC SERPL-MCNC: 35 MG/DL (ref 40–60)
IRON 24H UR-MRATE: 64 MCG/DL (ref 59–158)
IRON SATN MFR SERPL: 13 % (ref 20–50)
LDLC SERPL CALC-MCNC: 102 MG/DL (ref 0–100)
LDLC/HDLC SERPL: 2.58 {RATIO}
Lab: ABNORMAL
POTASSIUM SERPL-SCNC: 4.4 MMOL/L (ref 3.5–5.2)
PROT SERPL-MCNC: 6.8 G/DL (ref 6–8.5)
SODIUM SERPL-SCNC: 139 MMOL/L (ref 136–145)
TIBC SERPL-MCNC: 495 MCG/DL (ref 298–536)
TRANSFERRIN SERPL-MCNC: 332 MG/DL (ref 200–360)
TRIGL SERPL-MCNC: 363 MG/DL (ref 0–150)
TSH SERPL DL<=0.05 MIU/L-ACNC: 4.43 UIU/ML (ref 0.27–4.2)
VLDLC SERPL-MCNC: 61 MG/DL (ref 5–40)

## 2024-03-06 PROCEDURE — 80061 LIPID PANEL: CPT | Performed by: INTERNAL MEDICINE

## 2024-03-06 PROCEDURE — 83540 ASSAY OF IRON: CPT | Performed by: INTERNAL MEDICINE

## 2024-03-06 PROCEDURE — 80053 COMPREHEN METABOLIC PANEL: CPT | Performed by: INTERNAL MEDICINE

## 2024-03-06 PROCEDURE — 84443 ASSAY THYROID STIM HORMONE: CPT | Performed by: INTERNAL MEDICINE

## 2024-03-06 PROCEDURE — 36415 COLL VENOUS BLD VENIPUNCTURE: CPT | Performed by: INTERNAL MEDICINE

## 2024-03-06 PROCEDURE — 84466 ASSAY OF TRANSFERRIN: CPT | Performed by: INTERNAL MEDICINE

## 2024-03-06 RX ORDER — METFORMIN HYDROCHLORIDE 500 MG/1
TABLET, EXTENDED RELEASE ORAL
Qty: 90 TABLET | Refills: 5 | Status: SHIPPED | OUTPATIENT
Start: 2024-03-06

## 2024-03-06 NOTE — PROGRESS NOTES
"Answers submitted by the patient for this visit:  Primary Reason for Visit (Submitted on 3/6/2024)  What is the primary reason for your visit?: Diabetes  Diabetes Questionnaire (Submitted on 3/6/2024)  Chief Complaint: Diabetes problem  Below 70: never  Chief Complaint  Follow-up (3 month follow up. No questions or concerns. )    Subjective          John Avelar presents to [facility]  History of Present Illness    DM type II-last A1c done 4 months ago was 7.2.  He is on metformin 500 milligram 2 daily- we had raised in 11/23.  He has tolerated  Diet could be a little better - could decrease carbs.  He is living with his parents now and harder to control diet.  When he lived in an apartment, felt like he did better  On his dad side there is a strong family history of diabetes.  One of his younger brothers is now on metformin and another brother is on simvastatin    Hypothyroid-he is on Synthroid 25 and last TSH was 3.3 done 4 months ago    Hypertriglyceridemia-triglycerides were 241 done 4 months ago.  He is fasting today    Depression-he is on Wellbutrin XL.  Feels like he is doing well.  match day is tomorrow and he is doing a couple match with his fiancée      Current Outpatient Medications:     buPROPion XL (Wellbutrin XL) 300 MG 24 hr tablet, Take 1 tablet by mouth Daily., Disp: 90 tablet, Rfl: 3    levothyroxine (Synthroid) 25 MCG tablet, Take 1 tablet by mouth Daily. Take on empty stomach, Disp: 90 tablet, Rfl: 3    metFORMIN ER (GLUCOPHAGE-XR) 500 MG 24 hr tablet, 1 qam 2 qpm with food, Disp: 90 tablet, Rfl: 5   The following portions of the patient's history were reviewed and updated as appropriate: allergies, current medications, past family history, past medical history, past social history, past surgical history and problem list.     Objective   Vital Signs:   /78   Pulse 78   Temp 97.8 °F (36.6 °C) (Temporal)   Resp 20   Ht 184.4 cm (72.6\")   Wt 86.5 kg (190 " lb 12.8 oz)   SpO2 98%   BMI 25.45 kg/m²       Physical exam  Constitutional: oriented to person, place, and time.  well-developed and well-nourished. No distress.   HENT:   Head: Normocephalic and atraumatic.   Eyes: Conjunctivae and EOM are normal.   Cardiovascular: Normal rate, regular rhythm and normal heart sounds.  Exam reveals no gallop and no friction rub.    No murmur heard.  Pulmonary/Chest: Effort normal and breath sounds normal. No respiratory distress.   no wheezes.   Neurological:  alert and oriented to person, place, and time.   Skin: Skin is warm and dry. not diaphoretic.   Psychiatric:  normal mood and affect. behavior is normal. Judgment and thought content normal.      Result Review :   The following data was reviewed by: Judy Ferguson MD on 03/06/2024:  CMP          6/19/2023    10:53 11/30/2023    14:30   CMP   Glucose 118  115    BUN 14  14    Creatinine 1.04  1.27    EGFR 100.9  79.4    Sodium 141  141    Potassium 4.4  4.5    Chloride 107  105    Calcium 9.8  10.0    Total Protein 6.9  7.0    Albumin 4.8  4.7    Globulin 2.1  2.3    Total Bilirubin 0.3  0.3    Alkaline Phosphatase 53  67    AST (SGOT) 18  22    ALT (SGPT) 25  34    Albumin/Globulin Ratio 2.3  2.0    BUN/Creatinine Ratio 13.5  11.0    Anion Gap 12.0  12.9      Lipid Panel          6/19/2023    10:53 11/30/2023    14:30   Lipid Panel   Total Cholesterol 197  185    Triglycerides 240  241    HDL Cholesterol 38  35    VLDL Cholesterol 42  42    LDL Cholesterol  117  108    LDL/HDL Ratio 2.92  2.91      TSH          6/19/2023    10:53 11/30/2023    14:30   TSH   TSH 3.410  3.380      A1C Last 3 Results          6/19/2023    10:53 11/30/2023    15:39 3/6/2024    11:59   HGBA1C Last 3 Results   Hemoglobin A1C 6.10  7.2  7.4                   Assessment and Plan    Diagnoses and all orders for this visit:    1. Controlled type 2 diabetes mellitus without complication, without long-term current use of insulin (Primary)-A1c up a  little bit.  He does feel like he can improve diet some and has restarted exercise.  We will also go up on metformin to 3 a day.  We will have him return in 3 months for an A1c    -     POC Glycosylated Hemoglobin (Hb A1C)  -     metFORMIN ER (GLUCOPHAGE-XR) 500 MG 24 hr tablet; 1 qam 2 qpm with food  Dispense: 90 tablet; Refill: 5    2. Hypothyroidism (acquired)-recheck today  -     TSH; Future    3. Hypertriglyceridemia-recheck today  -     Comprehensive Metabolic Panel; Future  -     Lipid Panel; Future    4. Moderate episode of recurrent major depressive disorder-doing well on Wellbutrin        Follow Up   Return in about 3 months (around 6/6/2024).  Patient was given instructions and counseling regarding his condition or for health maintenance advice. Please see specific information pulled into the AVS if appropriate.

## 2024-03-10 RX ORDER — LEVOTHYROXINE SODIUM 0.05 MG/1
50 TABLET ORAL DAILY
Qty: 90 TABLET | Refills: 0 | Status: SHIPPED | OUTPATIENT
Start: 2024-03-10

## 2024-05-30 ENCOUNTER — OFFICE VISIT (OUTPATIENT)
Dept: INTERNAL MEDICINE | Facility: CLINIC | Age: 29
End: 2024-05-30
Payer: MEDICAID

## 2024-05-30 VITALS
BODY MASS INDEX: 24.65 KG/M2 | TEMPERATURE: 97.8 F | HEART RATE: 84 BPM | WEIGHT: 182 LBS | SYSTOLIC BLOOD PRESSURE: 122 MMHG | OXYGEN SATURATION: 99 % | DIASTOLIC BLOOD PRESSURE: 74 MMHG | HEIGHT: 72 IN

## 2024-05-30 DIAGNOSIS — E11.9 CONTROLLED TYPE 2 DIABETES MELLITUS WITHOUT COMPLICATION, WITHOUT LONG-TERM CURRENT USE OF INSULIN: Primary | Chronic | ICD-10-CM

## 2024-05-30 DIAGNOSIS — E78.1 HYPERTRIGLYCERIDEMIA: Chronic | ICD-10-CM

## 2024-05-30 DIAGNOSIS — E03.9 HYPOTHYROIDISM (ACQUIRED): Primary | Chronic | ICD-10-CM

## 2024-05-30 DIAGNOSIS — E03.9 HYPOTHYROIDISM (ACQUIRED): Chronic | ICD-10-CM

## 2024-05-30 PROCEDURE — 1159F MED LIST DOCD IN RCRD: CPT | Performed by: INTERNAL MEDICINE

## 2024-05-30 PROCEDURE — 99214 OFFICE O/P EST MOD 30 MIN: CPT | Performed by: INTERNAL MEDICINE

## 2024-05-30 PROCEDURE — 3051F HG A1C>EQUAL 7.0%<8.0%: CPT | Performed by: INTERNAL MEDICINE

## 2024-05-30 PROCEDURE — 1160F RVW MEDS BY RX/DR IN RCRD: CPT | Performed by: INTERNAL MEDICINE

## 2024-05-30 PROCEDURE — 1126F AMNT PAIN NOTED NONE PRSNT: CPT | Performed by: INTERNAL MEDICINE

## 2024-05-30 RX ORDER — LEVOTHYROXINE SODIUM 0.03 MG/1
TABLET ORAL
Qty: 90 TABLET | Refills: 0 | OUTPATIENT
Start: 2024-05-30

## 2024-05-30 NOTE — TELEPHONE ENCOUNTER
Rx Refill Note  Requested Prescriptions     Pending Prescriptions Disp Refills    levothyroxine (SYNTHROID, LEVOTHROID) 25 MCG tablet [Pharmacy Med Name: LEVOTHYROXINE 25 MCG TABLET] 90 tablet 0     Sig: TAKE 1 TABLET BY MOUTH DAILY  **TAKE ON AN EMPTY STOMACH**      Last office visit with prescribing clinician: 5/30/2024     Next office visit with prescribing clinician: Visit date not found     Rafaela Barnett  05/30/24, 16:58 EDT

## 2024-05-30 NOTE — PROGRESS NOTES
Answers submitted by the patient for this visit:  Primary Reason for Visit (Submitted on 5/29/2024)  What is the primary reason for your visit?: Diabetes  Diabetes Questionnaire (Submitted on 5/29/2024)  Chief Complaint: Diabetes problem  Diabetes type: type 2  MedicAlert ID: [id number]  Disease duration: 7 Years  Treatment compliance: most of the time  Symptom course: stable  blurred vision: No  foot paresthesias: No  foot ulcerations: No  polydipsia: No  polyuria: No  weight loss: No  confusion: No  headaches: No  speech difficulty: No  sweats: No  tremors: No  Current diet: generally healthy  Meal planning: avoidance of concentrated sweets  Exercise: three times a week  Eye exam current: Yes  Sees podiatrist: No  Chief Complaint  Diabetes    Subjective          John Avelar presents to [facility]  History of Present Illness    DMT2- he is tolerating the higher dose of metformin, 3 daily.  He is trying to exercise more (walking and weightlifting) and eating better.  Weight is down about 8 pounds.  A1c was done 2-1/2 months ago and was 7.4.    Hypothyroid-he is on Synthroid 50 that we raised about 3 months ago.  Has not noticed any difference.  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     Objective   Vital Signs:   /74   Pulse 84   Temp 97.8 °F (36.6 °C) (Infrared)   Ht 184  "cm (72.44\")   Wt 82.6 kg (182 lb)   SpO2 99%   BMI 24.38 kg/m²       Physical exam  Constitutional: oriented to person, place, and time.  well-developed and well-nourished. No distress.   HENT:   Head: Normocephalic and atraumatic.   Eyes: Conjunctivae and EOM are normal.   Cardiovascular: Normal rate, regular rhythm and normal heart sounds.  Exam reveals no gallop and no friction rub.    No murmur heard.  Pulmonary/Chest: Effort normal and breath sounds normal. No respiratory distress.   no wheezes.   Neurological:  alert and oriented to person, place, and time.   Skin: Skin is warm and dry. not diaphoretic.   Psychiatric:  normal mood and affect. behavior is normal. Judgment and thought content normal.      Result Review :                   Assessment and Plan    Diagnoses and all orders for this visit:    1. Controlled type 2 diabetes mellitus without complication, without long-term current use of insulin (Primary)  -     Hemoglobin A1c; Future    2. Hypertriglyceridemia  -     Comprehensive Metabolic Panel; Future  -     Lipid Panel; Future    3. Hypothyroidism (acquired)  -     TSH; Future    He will return in mid June to get fasting labs and we can recheck A1c and thyroid as well    Follow Up   No follow-ups on file.  Patient was given instructions and counseling regarding his condition or for health maintenance advice. Please see specific information pulled into the AVS if appropriate.       "

## 2024-06-08 DIAGNOSIS — E03.9 HYPOTHYROIDISM (ACQUIRED): Primary | ICD-10-CM

## 2024-06-10 NOTE — TELEPHONE ENCOUNTER
Rx Refill Note  Requested Prescriptions     Pending Prescriptions Disp Refills    levothyroxine (SYNTHROID, LEVOTHROID) 50 MCG tablet [Pharmacy Med Name: LEVOTHYROXINE 50 MCG TABLET] 90 tablet 0     Sig: TAKE 1 TABLET BY MOUTH DAILY      Last office visit with prescribing clinician: 5/30/2024     Next office visit with prescribing clinician: Visit date not found       Rafaela Barnett  06/10/24, 15:29 EDT

## 2024-06-13 ENCOUNTER — LAB (OUTPATIENT)
Dept: INTERNAL MEDICINE | Facility: CLINIC | Age: 29
End: 2024-06-13
Payer: MEDICAID

## 2024-06-13 DIAGNOSIS — E03.9 HYPOTHYROIDISM (ACQUIRED): Chronic | ICD-10-CM

## 2024-06-13 DIAGNOSIS — E78.1 HYPERTRIGLYCERIDEMIA: Chronic | ICD-10-CM

## 2024-06-13 DIAGNOSIS — Z11.1 SCREENING-PULMONARY TB: ICD-10-CM

## 2024-06-13 DIAGNOSIS — E11.9 CONTROLLED TYPE 2 DIABETES MELLITUS WITHOUT COMPLICATION, WITHOUT LONG-TERM CURRENT USE OF INSULIN: Chronic | ICD-10-CM

## 2024-06-13 LAB
ALBUMIN SERPL-MCNC: 4.7 G/DL (ref 3.5–5.2)
ALBUMIN/GLOB SERPL: 2.2 G/DL
ALP SERPL-CCNC: 70 U/L (ref 39–117)
ALT SERPL W P-5'-P-CCNC: 35 U/L (ref 1–41)
ANION GAP SERPL CALCULATED.3IONS-SCNC: 12 MMOL/L (ref 5–15)
AST SERPL-CCNC: 21 U/L (ref 1–40)
BILIRUB SERPL-MCNC: 0.3 MG/DL (ref 0–1.2)
BUN SERPL-MCNC: 13 MG/DL (ref 6–20)
BUN/CREAT SERPL: 12.3 (ref 7–25)
CALCIUM SPEC-SCNC: 9.8 MG/DL (ref 8.6–10.5)
CHLORIDE SERPL-SCNC: 104 MMOL/L (ref 98–107)
CHOLEST SERPL-MCNC: 159 MG/DL (ref 0–200)
CO2 SERPL-SCNC: 23 MMOL/L (ref 22–29)
CREAT SERPL-MCNC: 1.06 MG/DL (ref 0.76–1.27)
EGFRCR SERPLBLD CKD-EPI 2021: 98 ML/MIN/1.73
GLOBULIN UR ELPH-MCNC: 2.1 GM/DL
GLUCOSE SERPL-MCNC: 130 MG/DL (ref 65–99)
HBA1C MFR BLD: 6.6 % (ref 4.8–5.6)
HDLC SERPL-MCNC: 34 MG/DL (ref 40–60)
LDLC SERPL CALC-MCNC: 89 MG/DL (ref 0–100)
LDLC/HDLC SERPL: 2.43 {RATIO}
POTASSIUM SERPL-SCNC: 4.3 MMOL/L (ref 3.5–5.2)
PROT SERPL-MCNC: 6.8 G/DL (ref 6–8.5)
SODIUM SERPL-SCNC: 139 MMOL/L (ref 136–145)
TRIGL SERPL-MCNC: 212 MG/DL (ref 0–150)
TSH SERPL DL<=0.05 MIU/L-ACNC: 3.84 UIU/ML (ref 0.27–4.2)
VLDLC SERPL-MCNC: 36 MG/DL (ref 5–40)

## 2024-06-13 PROCEDURE — 83036 HEMOGLOBIN GLYCOSYLATED A1C: CPT | Performed by: INTERNAL MEDICINE

## 2024-06-13 PROCEDURE — 36415 COLL VENOUS BLD VENIPUNCTURE: CPT | Performed by: INTERNAL MEDICINE

## 2024-06-13 PROCEDURE — 80053 COMPREHEN METABOLIC PANEL: CPT | Performed by: INTERNAL MEDICINE

## 2024-06-13 PROCEDURE — 84443 ASSAY THYROID STIM HORMONE: CPT | Performed by: INTERNAL MEDICINE

## 2024-06-13 PROCEDURE — 36415 COLL VENOUS BLD VENIPUNCTURE: CPT

## 2024-06-13 PROCEDURE — 80061 LIPID PANEL: CPT | Performed by: INTERNAL MEDICINE

## 2024-06-13 PROCEDURE — 86480 TB TEST CELL IMMUN MEASURE: CPT | Performed by: INTERNAL MEDICINE

## 2024-06-14 RX ORDER — LEVOTHYROXINE SODIUM 0.05 MG/1
50 TABLET ORAL DAILY
Qty: 90 TABLET | Refills: 3 | Status: SHIPPED | OUTPATIENT
Start: 2024-06-14

## 2024-06-15 LAB
GAMMA INTERFERON BACKGROUND BLD IA-ACNC: 0.08 IU/ML
M TB IFN-G BLD-IMP: NEGATIVE
M TB IFN-G CD4+ BCKGRND COR BLD-ACNC: 0.1 IU/ML
M TB IFN-G CD4+CD8+ BCKGRND COR BLD-ACNC: 0.1 IU/ML
MITOGEN IGNF BCKGRD COR BLD-ACNC: >10 IU/ML
SERVICE CMNT-IMP: NORMAL

## 2024-06-23 DIAGNOSIS — F33.1 MODERATE EPISODE OF RECURRENT MAJOR DEPRESSIVE DISORDER: Chronic | ICD-10-CM

## 2024-06-24 RX ORDER — BUPROPION HYDROCHLORIDE 300 MG/1
300 TABLET ORAL DAILY
Qty: 90 TABLET | Refills: 3 | Status: SHIPPED | OUTPATIENT
Start: 2024-06-24

## 2024-06-24 NOTE — TELEPHONE ENCOUNTER
Rx Refill Note  Requested Prescriptions     Pending Prescriptions Disp Refills    buPROPion XL (Wellbutrin XL) 300 MG 24 hr tablet 90 tablet 3     Sig: Take 1 tablet by mouth Daily.      Last office visit with prescribing clinician: 5/30/2024   Last telemedicine visit with prescribing clinician: Visit date not found   Next office visit with prescribing clinician: Visit date not found       Izzy Oneil MA  06/24/24, 09:04 EDT

## 2024-07-03 DIAGNOSIS — F33.1 MODERATE EPISODE OF RECURRENT MAJOR DEPRESSIVE DISORDER: Chronic | ICD-10-CM

## 2024-07-05 RX ORDER — BUPROPION HYDROCHLORIDE 300 MG/1
300 TABLET ORAL DAILY
Qty: 90 TABLET | Refills: 0 | OUTPATIENT
Start: 2024-07-05